# Patient Record
Sex: MALE | Race: BLACK OR AFRICAN AMERICAN | NOT HISPANIC OR LATINO | URBAN - METROPOLITAN AREA
[De-identification: names, ages, dates, MRNs, and addresses within clinical notes are randomized per-mention and may not be internally consistent; named-entity substitution may affect disease eponyms.]

---

## 2021-04-22 PROCEDURE — 93010 ELECTROCARDIOGRAM REPORT: CPT

## 2021-04-23 ENCOUNTER — EMERGENCY (EMERGENCY)
Facility: HOSPITAL | Age: 76
LOS: 0 days | Discharge: HOME | End: 2021-04-23
Attending: EMERGENCY MEDICINE
Payer: MEDICARE

## 2021-04-23 VITALS
OXYGEN SATURATION: 99 % | DIASTOLIC BLOOD PRESSURE: 84 MMHG | TEMPERATURE: 98 F | RESPIRATION RATE: 17 BRPM | HEART RATE: 62 BPM | SYSTOLIC BLOOD PRESSURE: 143 MMHG

## 2021-04-23 VITALS
WEIGHT: 149.03 LBS | SYSTOLIC BLOOD PRESSURE: 136 MMHG | HEIGHT: 69 IN | DIASTOLIC BLOOD PRESSURE: 83 MMHG | OXYGEN SATURATION: 98 % | RESPIRATION RATE: 17 BRPM | TEMPERATURE: 98 F | HEART RATE: 79 BPM

## 2021-04-23 DIAGNOSIS — Z98.89 OTHER SPECIFIED POSTPROCEDURAL STATES: Chronic | ICD-10-CM

## 2021-04-23 DIAGNOSIS — Z00.00 ENCOUNTER FOR GENERAL ADULT MEDICAL EXAMINATION WITHOUT ABNORMAL FINDINGS: ICD-10-CM

## 2021-04-23 DIAGNOSIS — K43.9 VENTRAL HERNIA WITHOUT OBSTRUCTION OR GANGRENE: ICD-10-CM

## 2021-04-23 DIAGNOSIS — F03.90 UNSPECIFIED DEMENTIA WITHOUT BEHAVIORAL DISTURBANCE: ICD-10-CM

## 2021-04-23 DIAGNOSIS — K43.9 VENTRAL HERNIA WITHOUT OBSTRUCTION OR GANGRENE: Chronic | ICD-10-CM

## 2021-04-23 DIAGNOSIS — E78.00 PURE HYPERCHOLESTEROLEMIA, UNSPECIFIED: ICD-10-CM

## 2021-04-23 DIAGNOSIS — I10 ESSENTIAL (PRIMARY) HYPERTENSION: ICD-10-CM

## 2021-04-23 LAB
ALBUMIN SERPL ELPH-MCNC: 3.5 G/DL — SIGNIFICANT CHANGE UP (ref 3.3–5)
ALP SERPL-CCNC: 73 U/L — SIGNIFICANT CHANGE UP (ref 40–120)
ALT FLD-CCNC: 35 U/L — SIGNIFICANT CHANGE UP (ref 12–78)
ANION GAP SERPL CALC-SCNC: 4 MMOL/L — LOW (ref 5–17)
AST SERPL-CCNC: 34 U/L — SIGNIFICANT CHANGE UP (ref 15–37)
BASOPHILS # BLD AUTO: 0.03 K/UL — SIGNIFICANT CHANGE UP (ref 0–0.2)
BASOPHILS NFR BLD AUTO: 0.8 % — SIGNIFICANT CHANGE UP (ref 0–2)
BILIRUB SERPL-MCNC: 0.9 MG/DL — SIGNIFICANT CHANGE UP (ref 0.2–1.2)
BUN SERPL-MCNC: 9 MG/DL — SIGNIFICANT CHANGE UP (ref 7–23)
CALCIUM SERPL-MCNC: 8.9 MG/DL — SIGNIFICANT CHANGE UP (ref 8.5–10.1)
CHLORIDE SERPL-SCNC: 102 MMOL/L — SIGNIFICANT CHANGE UP (ref 96–108)
CO2 SERPL-SCNC: 32 MMOL/L — HIGH (ref 22–31)
CREAT SERPL-MCNC: 0.84 MG/DL — SIGNIFICANT CHANGE UP (ref 0.5–1.3)
EOSINOPHIL # BLD AUTO: 0.05 K/UL — SIGNIFICANT CHANGE UP (ref 0–0.5)
EOSINOPHIL NFR BLD AUTO: 1.4 % — SIGNIFICANT CHANGE UP (ref 0–6)
GLUCOSE SERPL-MCNC: 84 MG/DL — SIGNIFICANT CHANGE UP (ref 70–99)
HCT VFR BLD CALC: 37.9 % — LOW (ref 39–50)
HGB BLD-MCNC: 11.8 G/DL — LOW (ref 13–17)
IMM GRANULOCYTES NFR BLD AUTO: 0.3 % — SIGNIFICANT CHANGE UP (ref 0–1.5)
LYMPHOCYTES # BLD AUTO: 1.81 K/UL — SIGNIFICANT CHANGE UP (ref 1–3.3)
LYMPHOCYTES # BLD AUTO: 49.6 % — HIGH (ref 13–44)
MAGNESIUM SERPL-MCNC: 2.2 MG/DL — SIGNIFICANT CHANGE UP (ref 1.6–2.6)
MCHC RBC-ENTMCNC: 26.8 PG — LOW (ref 27–34)
MCHC RBC-ENTMCNC: 31.1 GM/DL — LOW (ref 32–36)
MCV RBC AUTO: 85.9 FL — SIGNIFICANT CHANGE UP (ref 80–100)
MONOCYTES # BLD AUTO: 0.32 K/UL — SIGNIFICANT CHANGE UP (ref 0–0.9)
MONOCYTES NFR BLD AUTO: 8.8 % — SIGNIFICANT CHANGE UP (ref 2–14)
NEUTROPHILS # BLD AUTO: 1.43 K/UL — LOW (ref 1.8–7.4)
NEUTROPHILS NFR BLD AUTO: 39.1 % — LOW (ref 43–77)
NRBC # BLD: 0 /100 WBCS — SIGNIFICANT CHANGE UP (ref 0–0)
PLATELET # BLD AUTO: 254 K/UL — SIGNIFICANT CHANGE UP (ref 150–400)
POTASSIUM SERPL-MCNC: 4.1 MMOL/L — SIGNIFICANT CHANGE UP (ref 3.5–5.3)
POTASSIUM SERPL-SCNC: 4.1 MMOL/L — SIGNIFICANT CHANGE UP (ref 3.5–5.3)
PROT SERPL-MCNC: 7.4 GM/DL — SIGNIFICANT CHANGE UP (ref 6–8.3)
RBC # BLD: 4.41 M/UL — SIGNIFICANT CHANGE UP (ref 4.2–5.8)
RBC # FLD: 14.8 % — HIGH (ref 10.3–14.5)
SODIUM SERPL-SCNC: 138 MMOL/L — SIGNIFICANT CHANGE UP (ref 135–145)
WBC # BLD: 3.65 K/UL — LOW (ref 3.8–10.5)
WBC # FLD AUTO: 3.65 K/UL — LOW (ref 3.8–10.5)

## 2021-04-23 PROCEDURE — 99284 EMERGENCY DEPT VISIT MOD MDM: CPT

## 2021-04-23 NOTE — ED ADULT TRIAGE NOTE - CHIEF COMPLAINT QUOTE
76 y/o male with PMH of HTN, HLD & stage 4 Dementia. Presents to the ED with c/c of needing evaluation due to 5.9 potassium level that was drawn yesterday at PMD office. pt denies any N/V/D/C chills or fever.

## 2021-04-23 NOTE — ED PROVIDER NOTE - OBJECTIVE STATEMENT
75 years old male brought in by wife sts pt had potassium 5.9 which was drawn yesterday. Pt has a hx of dementia alert and oriented to person follows verbal commends but unable to give detail hx wife provides all informations.

## 2021-04-23 NOTE — ED ADULT NURSE NOTE - OBJECTIVE STATEMENT
pt received with wife, sent by pcp , as per spouse pt potassium is elevated . pt denies chest pain, no sob, pt has hx dementia .

## 2021-04-23 NOTE — ED PROVIDER NOTE - PATIENT PORTAL LINK FT
You can access the FollowMyHealth Patient Portal offered by [facility] by registering at the following website: [email or web address]. By joining [employer]’s FollowMyHealth portal, you will also be able to view your health information using other applications (apps) compatible with our system.

## 2021-04-23 NOTE — ED PROVIDER NOTE - CONSTITUTIONAL, MLM
normal... Well appearing, awake, alert, oriented to person, situation and in no apparent distress. Speaking in clear full sentences no nasal flaring no shoulders retractions no diaphoresis smiling pleasant appears very comfortable sitting up in the stretcher in a bright light room

## 2021-04-23 NOTE — ED ADULT NURSE NOTE - CHIEF COMPLAINT QUOTE
74 y/o male with PMH of HTN, HLD & stage 4 Dementia. Presents to the ED with c/c of needing evaluation due to 5.9 potassium level that was drawn yesterday at PMD office. pt denies any N/V/D/C chills or fever.

## 2023-08-02 ENCOUNTER — EMERGENCY (EMERGENCY)
Facility: HOSPITAL | Age: 78
LOS: 0 days | Discharge: ANOTHER HOSPITAL | End: 2023-08-02
Attending: EMERGENCY MEDICINE
Payer: MEDICARE

## 2023-08-02 ENCOUNTER — INPATIENT (INPATIENT)
Facility: HOSPITAL | Age: 78
LOS: 1 days | Discharge: HOME | End: 2023-08-04
Attending: STUDENT IN AN ORGANIZED HEALTH CARE EDUCATION/TRAINING PROGRAM | Admitting: STUDENT IN AN ORGANIZED HEALTH CARE EDUCATION/TRAINING PROGRAM
Payer: MEDICARE

## 2023-08-02 VITALS
RESPIRATION RATE: 18 BRPM | SYSTOLIC BLOOD PRESSURE: 113 MMHG | HEART RATE: 52 BPM | OXYGEN SATURATION: 95 % | HEIGHT: 69 IN | DIASTOLIC BLOOD PRESSURE: 69 MMHG | WEIGHT: 149.91 LBS

## 2023-08-02 VITALS
RESPIRATION RATE: 18 BRPM | TEMPERATURE: 98 F | HEART RATE: 85 BPM | SYSTOLIC BLOOD PRESSURE: 140 MMHG | OXYGEN SATURATION: 99 % | HEIGHT: 69 IN | DIASTOLIC BLOOD PRESSURE: 65 MMHG

## 2023-08-02 VITALS — TEMPERATURE: 97 F
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
## 2023-08-02 DIAGNOSIS — Z98.89 OTHER SPECIFIED POSTPROCEDURAL STATES: Chronic | ICD-10-CM

## 2023-08-02 DIAGNOSIS — I11.9 HYPERTENSIVE HEART DISEASE WITHOUT HEART FAILURE: ICD-10-CM

## 2023-08-02 DIAGNOSIS — K43.9 VENTRAL HERNIA WITHOUT OBSTRUCTION OR GANGRENE: Chronic | ICD-10-CM

## 2023-08-02 DIAGNOSIS — E78.00 PURE HYPERCHOLESTEROLEMIA, UNSPECIFIED: ICD-10-CM

## 2023-08-02 DIAGNOSIS — I10 ESSENTIAL (PRIMARY) HYPERTENSION: ICD-10-CM

## 2023-08-02 DIAGNOSIS — F03.90 UNSPECIFIED DEMENTIA WITHOUT BEHAVIORAL DISTURBANCE: ICD-10-CM
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
## 2023-08-02 DIAGNOSIS — Z88.8 ALLERGY STATUS TO OTHER DRUGS, MEDICAMENTS AND BIOLOGICAL SUBSTANCES: ICD-10-CM
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
## 2023-08-02 DIAGNOSIS — M19.90 UNSPECIFIED OSTEOARTHRITIS, UNSPECIFIED SITE: ICD-10-CM

## 2023-08-02 DIAGNOSIS — R55 SYNCOPE AND COLLAPSE: ICD-10-CM

## 2023-08-02 DIAGNOSIS — G30.9 ALZHEIMER'S DISEASE, UNSPECIFIED: ICD-10-CM

## 2023-08-02 DIAGNOSIS — Z29.9 ENCOUNTER FOR PROPHYLACTIC MEASURES, UNSPECIFIED: ICD-10-CM

## 2023-08-02 DIAGNOSIS — R00.1 BRADYCARDIA, UNSPECIFIED: ICD-10-CM

## 2023-08-02 DIAGNOSIS — Z87.19 PERSONAL HISTORY OF OTHER DISEASES OF THE DIGESTIVE SYSTEM: ICD-10-CM

## 2023-08-02 LAB
ALBUMIN SERPL ELPH-MCNC: 3.5 G/DL — SIGNIFICANT CHANGE UP (ref 3.3–5)
ALBUMIN SERPL ELPH-MCNC: 4.4 G/DL — SIGNIFICANT CHANGE UP (ref 3.3–5)
ALP SERPL-CCNC: 74 U/L — SIGNIFICANT CHANGE UP (ref 40–120)
ALP SERPL-CCNC: 78 U/L — SIGNIFICANT CHANGE UP (ref 40–120)
ALT FLD-CCNC: 21 U/L — SIGNIFICANT CHANGE UP (ref 4–41)
ALT FLD-CCNC: 26 U/L — SIGNIFICANT CHANGE UP (ref 12–78)
ANION GAP SERPL CALC-SCNC: 5 MMOL/L — SIGNIFICANT CHANGE UP (ref 5–17)
ANION GAP SERPL CALC-SCNC: 9 MMOL/L — SIGNIFICANT CHANGE UP (ref 7–14)
APTT BLD: 26.7 SEC — SIGNIFICANT CHANGE UP (ref 24.5–35.6)
APTT BLD: 29.4 SEC — SIGNIFICANT CHANGE UP (ref 24.5–35.6)
AST SERPL-CCNC: 21 U/L — SIGNIFICANT CHANGE UP (ref 15–37)
AST SERPL-CCNC: 26 U/L — SIGNIFICANT CHANGE UP (ref 4–40)
BASOPHILS # BLD AUTO: 0.01 K/UL — SIGNIFICANT CHANGE UP (ref 0–0.2)
BASOPHILS # BLD AUTO: 0.01 K/UL — SIGNIFICANT CHANGE UP (ref 0–0.2)
BASOPHILS NFR BLD AUTO: 0.2 % — SIGNIFICANT CHANGE UP (ref 0–2)
BASOPHILS NFR BLD AUTO: 0.2 % — SIGNIFICANT CHANGE UP (ref 0–2)
BILIRUB SERPL-MCNC: 1.2 MG/DL — SIGNIFICANT CHANGE UP (ref 0.2–1.2)
BILIRUB SERPL-MCNC: 1.8 MG/DL — HIGH (ref 0.2–1.2)
BUN SERPL-MCNC: 11 MG/DL — SIGNIFICANT CHANGE UP (ref 7–23)
BUN SERPL-MCNC: 12 MG/DL — SIGNIFICANT CHANGE UP (ref 7–23)
CALCIUM SERPL-MCNC: 8.8 MG/DL — SIGNIFICANT CHANGE UP (ref 8.5–10.1)
CALCIUM SERPL-MCNC: 9.7 MG/DL — SIGNIFICANT CHANGE UP (ref 8.4–10.5)
CHLORIDE SERPL-SCNC: 102 MMOL/L — SIGNIFICANT CHANGE UP (ref 98–107)
CHLORIDE SERPL-SCNC: 107 MMOL/L — SIGNIFICANT CHANGE UP (ref 96–108)
CK MB BLD-MCNC: 1.2 % — SIGNIFICANT CHANGE UP (ref 0–2.5)
CK MB CFR SERPL CALC: 5 NG/ML — SIGNIFICANT CHANGE UP
CK SERPL-CCNC: 424 U/L — HIGH (ref 30–200)
CO2 SERPL-SCNC: 28 MMOL/L — SIGNIFICANT CHANGE UP (ref 22–31)
CO2 SERPL-SCNC: 29 MMOL/L — SIGNIFICANT CHANGE UP (ref 22–31)
CREAT SERPL-MCNC: 0.79 MG/DL — SIGNIFICANT CHANGE UP (ref 0.5–1.3)
CREAT SERPL-MCNC: 0.82 MG/DL — SIGNIFICANT CHANGE UP (ref 0.5–1.3)
D DIMER BLD IA.RAPID-MCNC: 408 NG/ML DDU — HIGH
EGFR: 90 ML/MIN/1.73M2 — SIGNIFICANT CHANGE UP
EGFR: 91 ML/MIN/1.73M2 — SIGNIFICANT CHANGE UP
EOSINOPHIL # BLD AUTO: 0.02 K/UL — SIGNIFICANT CHANGE UP (ref 0–0.5)
EOSINOPHIL # BLD AUTO: 0.04 K/UL — SIGNIFICANT CHANGE UP (ref 0–0.5)
EOSINOPHIL NFR BLD AUTO: 0.4 % — SIGNIFICANT CHANGE UP (ref 0–6)
EOSINOPHIL NFR BLD AUTO: 0.7 % — SIGNIFICANT CHANGE UP (ref 0–6)
GLUCOSE SERPL-MCNC: 116 MG/DL — HIGH (ref 70–99)
GLUCOSE SERPL-MCNC: 130 MG/DL — HIGH (ref 70–99)
HCT VFR BLD CALC: 37 % — LOW (ref 39–50)
HCT VFR BLD CALC: 39.3 % — SIGNIFICANT CHANGE UP (ref 39–50)
HGB BLD-MCNC: 12.3 G/DL — LOW (ref 13–17)
HGB BLD-MCNC: 12.8 G/DL — LOW (ref 13–17)
IANC: 3.38 K/UL — SIGNIFICANT CHANGE UP (ref 1.8–7.4)
IMM GRANULOCYTES NFR BLD AUTO: 0 % — SIGNIFICANT CHANGE UP (ref 0–0.9)
IMM GRANULOCYTES NFR BLD AUTO: 0.4 % — SIGNIFICANT CHANGE UP (ref 0–0.9)
INR BLD: 0.97 RATIO — SIGNIFICANT CHANGE UP (ref 0.85–1.18)
INR BLD: 1.02 RATIO — SIGNIFICANT CHANGE UP (ref 0.85–1.18)
LYMPHOCYTES # BLD AUTO: 0.9 K/UL — LOW (ref 1–3.3)
LYMPHOCYTES # BLD AUTO: 1.87 K/UL — SIGNIFICANT CHANGE UP (ref 1–3.3)
LYMPHOCYTES # BLD AUTO: 16.6 % — SIGNIFICANT CHANGE UP (ref 13–44)
LYMPHOCYTES # BLD AUTO: 32.8 % — SIGNIFICANT CHANGE UP (ref 13–44)
MAGNESIUM SERPL-MCNC: 2.1 MG/DL — SIGNIFICANT CHANGE UP (ref 1.6–2.6)
MAGNESIUM SERPL-MCNC: 2.2 MG/DL — SIGNIFICANT CHANGE UP (ref 1.6–2.6)
MCHC RBC-ENTMCNC: 28.8 PG — SIGNIFICANT CHANGE UP (ref 27–34)
MCHC RBC-ENTMCNC: 28.9 PG — SIGNIFICANT CHANGE UP (ref 27–34)
MCHC RBC-ENTMCNC: 32.6 GM/DL — SIGNIFICANT CHANGE UP (ref 32–36)
MCHC RBC-ENTMCNC: 33.2 G/DL — SIGNIFICANT CHANGE UP (ref 32–36)
MCV RBC AUTO: 87.1 FL — SIGNIFICANT CHANGE UP (ref 80–100)
MCV RBC AUTO: 88.3 FL — SIGNIFICANT CHANGE UP (ref 80–100)
MONOCYTES # BLD AUTO: 0.34 K/UL — SIGNIFICANT CHANGE UP (ref 0–0.9)
MONOCYTES # BLD AUTO: 0.4 K/UL — SIGNIFICANT CHANGE UP (ref 0–0.9)
MONOCYTES NFR BLD AUTO: 6.3 % — SIGNIFICANT CHANGE UP (ref 2–14)
MONOCYTES NFR BLD AUTO: 7 % — SIGNIFICANT CHANGE UP (ref 2–14)
NEUTROPHILS # BLD AUTO: 3.38 K/UL — SIGNIFICANT CHANGE UP (ref 1.8–7.4)
NEUTROPHILS # BLD AUTO: 4.13 K/UL — SIGNIFICANT CHANGE UP (ref 1.8–7.4)
NEUTROPHILS NFR BLD AUTO: 59.3 % — SIGNIFICANT CHANGE UP (ref 43–77)
NEUTROPHILS NFR BLD AUTO: 76.1 % — SIGNIFICANT CHANGE UP (ref 43–77)
NRBC # BLD: 0 /100 WBCS — SIGNIFICANT CHANGE UP (ref 0–0)
NRBC # BLD: 0 /100 WBCS — SIGNIFICANT CHANGE UP (ref 0–0)
NRBC # FLD: 0 K/UL — SIGNIFICANT CHANGE UP (ref 0–0)
NT-PROBNP SERPL-SCNC: 40 PG/ML — SIGNIFICANT CHANGE UP (ref 0–450)
PHOSPHATE SERPL-MCNC: 2.8 MG/DL — SIGNIFICANT CHANGE UP (ref 2.5–4.5)
PLATELET # BLD AUTO: 198 K/UL — SIGNIFICANT CHANGE UP (ref 150–400)
PLATELET # BLD AUTO: 218 K/UL — SIGNIFICANT CHANGE UP (ref 150–400)
POTASSIUM SERPL-MCNC: 3.7 MMOL/L — SIGNIFICANT CHANGE UP (ref 3.5–5.3)
POTASSIUM SERPL-MCNC: 4 MMOL/L — SIGNIFICANT CHANGE UP (ref 3.5–5.3)
POTASSIUM SERPL-SCNC: 3.7 MMOL/L — SIGNIFICANT CHANGE UP (ref 3.5–5.3)
POTASSIUM SERPL-SCNC: 4 MMOL/L — SIGNIFICANT CHANGE UP (ref 3.5–5.3)
PROT SERPL-MCNC: 6.9 GM/DL — SIGNIFICANT CHANGE UP (ref 6–8.3)
PROT SERPL-MCNC: 7.1 G/DL — SIGNIFICANT CHANGE UP (ref 6–8.3)
PROTHROM AB SERPL-ACNC: 11.4 SEC — SIGNIFICANT CHANGE UP (ref 9.5–13)
PROTHROM AB SERPL-ACNC: 11.6 SEC — SIGNIFICANT CHANGE UP (ref 9.5–13)
RBC # BLD: 4.25 M/UL — SIGNIFICANT CHANGE UP (ref 4.2–5.8)
RBC # BLD: 4.45 M/UL — SIGNIFICANT CHANGE UP (ref 4.2–5.8)
RBC # FLD: 14.4 % — SIGNIFICANT CHANGE UP (ref 10.3–14.5)
RBC # FLD: 14.5 % — SIGNIFICANT CHANGE UP (ref 10.3–14.5)
SODIUM SERPL-SCNC: 139 MMOL/L — SIGNIFICANT CHANGE UP (ref 135–145)
SODIUM SERPL-SCNC: 141 MMOL/L — SIGNIFICANT CHANGE UP (ref 135–145)
TROPONIN I, HIGH SENSITIVITY RESULT: 5.9 NG/L — SIGNIFICANT CHANGE UP
TROPONIN T, HIGH SENSITIVITY RESULT: 13 NG/L — SIGNIFICANT CHANGE UP
WBC # BLD: 5.42 K/UL — SIGNIFICANT CHANGE UP (ref 3.8–10.5)
WBC # BLD: 5.7 K/UL — SIGNIFICANT CHANGE UP (ref 3.8–10.5)
WBC # FLD AUTO: 5.42 K/UL — SIGNIFICANT CHANGE UP (ref 3.8–10.5)
WBC # FLD AUTO: 5.7 K/UL — SIGNIFICANT CHANGE UP (ref 3.8–10.5)

## 2023-08-02 PROCEDURE — 99285 EMERGENCY DEPT VISIT HI MDM: CPT

## 2023-08-02 PROCEDURE — 99233 SBSQ HOSP IP/OBS HIGH 50: CPT

## 2023-08-02 PROCEDURE — 99223 1ST HOSP IP/OBS HIGH 75: CPT

## 2023-08-02 PROCEDURE — 71275 CT ANGIOGRAPHY CHEST: CPT | Mod: 26,MA

## 2023-08-02 PROCEDURE — 93010 ELECTROCARDIOGRAM REPORT: CPT

## 2023-08-02 PROCEDURE — 70450 CT HEAD/BRAIN W/O DYE: CPT | Mod: 26,MA

## 2023-08-02 PROCEDURE — 71046 X-RAY EXAM CHEST 2 VIEWS: CPT | Mod: 26

## 2023-08-02 RX ORDER — DONEPEZIL HYDROCHLORIDE 10 MG/1
10 TABLET, FILM COATED ORAL AT BEDTIME
Refills: 0 | Status: DISCONTINUED | OUTPATIENT
Start: 2023-08-02 | End: 2023-08-04

## 2023-08-02 RX ORDER — OLANZAPINE 15 MG/1
2.5 TABLET, FILM COATED ORAL ONCE
Refills: 0 | Status: COMPLETED | OUTPATIENT
Start: 2023-08-02 | End: 2023-08-02

## 2023-08-02 RX ORDER — ACETAMINOPHEN 500 MG
650 TABLET ORAL EVERY 6 HOURS
Refills: 0 | Status: DISCONTINUED | OUTPATIENT
Start: 2023-08-02 | End: 2023-08-04

## 2023-08-02 RX ORDER — SODIUM CHLORIDE 9 MG/ML
1000 INJECTION, SOLUTION INTRAVENOUS ONCE
Refills: 0 | Status: COMPLETED | OUTPATIENT
Start: 2023-08-02 | End: 2023-08-02

## 2023-08-02 RX ORDER — HYDROCHLOROTHIAZIDE 25 MG
1 TABLET ORAL
Qty: 0 | Refills: 0 | DISCHARGE

## 2023-08-02 RX ORDER — LANOLIN ALCOHOL/MO/W.PET/CERES
3 CREAM (GRAM) TOPICAL AT BEDTIME
Refills: 0 | Status: DISCONTINUED | OUTPATIENT
Start: 2023-08-02 | End: 2023-08-04

## 2023-08-02 RX ORDER — SIMVASTATIN 20 MG/1
20 TABLET, FILM COATED ORAL AT BEDTIME
Refills: 0 | Status: DISCONTINUED | OUTPATIENT
Start: 2023-08-02 | End: 2023-08-04

## 2023-08-02 RX ORDER — SIMVASTATIN 20 MG/1
1 TABLET, FILM COATED ORAL
Qty: 0 | Refills: 0 | DISCHARGE

## 2023-08-02 RX ORDER — AMLODIPINE BESYLATE 2.5 MG/1
1 TABLET ORAL
Qty: 0 | Refills: 0 | DISCHARGE

## 2023-08-02 RX ORDER — HEPARIN SODIUM 5000 [USP'U]/ML
5000 INJECTION INTRAVENOUS; SUBCUTANEOUS EVERY 12 HOURS
Refills: 0 | Status: DISCONTINUED | OUTPATIENT
Start: 2023-08-02 | End: 2023-08-04

## 2023-08-02 RX ADMIN — SODIUM CHLORIDE 1000 MILLILITER(S): 9 INJECTION, SOLUTION INTRAVENOUS at 10:41

## 2023-08-02 RX ADMIN — SODIUM CHLORIDE 1000 MILLILITER(S): 9 INJECTION, SOLUTION INTRAVENOUS at 11:25

## 2023-08-02 RX ADMIN — DONEPEZIL HYDROCHLORIDE 10 MILLIGRAM(S): 10 TABLET, FILM COATED ORAL at 21:59

## 2023-08-02 RX ADMIN — Medication 3 MILLIGRAM(S): at 21:59

## 2023-08-02 RX ADMIN — SIMVASTATIN 20 MILLIGRAM(S): 20 TABLET, FILM COATED ORAL at 21:59

## 2023-08-02 RX ADMIN — OLANZAPINE 2.5 MILLIGRAM(S): 15 TABLET, FILM COATED ORAL at 20:43

## 2023-08-02 NOTE — H&P ADULT - NSHPPHYSICALEXAM_GEN_ALL_CORE
PHYSICAL EXAM:  VITALS: Vital Signs Last 24 Hrs  T(C): 36.8 (02 Aug 2023 21:35), Max: 36.8 (02 Aug 2023 21:35)  T(F): 98.2 (02 Aug 2023 21:35), Max: 98.2 (02 Aug 2023 21:35)  HR: 79 (02 Aug 2023 21:35) (52 - 85)  BP: 149/83 (02 Aug 2023 21:35) (113/69 - 158/95)  BP(mean): --  RR: 18 (02 Aug 2023 21:35) (16 - 18)  SpO2: 100% (02 Aug 2023 21:35) (95% - 100%)    Parameters below as of 02 Aug 2023 21:35  Patient On (Oxygen Delivery Method): room air      GENERAL: NAD, comfortable at bedside  HEAD:  Atraumatic, Normocephalic  EYES: EOMI, PERRL, conjunctiva and sclera clear  ENT: Moist Mucus Membranes present, no ulcers appreciated  NECK: Supple, No JVD  CHEST/LUNG: Clear to auscultation bilaterally; No wheezes, rales or rhonchi, no accessory muscle use  HEART: Regular rate and rhythm; No murmurs, rubs, or gallops, (+)S1, S2  ABDOMEN: Soft, Nontender, Nondistended; Normal Bowel sounds   EXTREMITIES: No clubbing, cyanosis, or edema, + small mild bruising on the anterior shoulder  PSYCH: normal mood and affect  NEUROLOGY: AAOx1, non-focal  SKIN: No rashes or lesions
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
## 2023-08-02 NOTE — ED PROVIDER NOTE - PHYSICAL EXAMINATION
Gen: Alert, NAD  Head: NC, AT   Eyes: PERRL, EOMI, normal lids/conjunctiva  ENT: diminished hearing, patent oropharynx without erythema/exudate, uvula midline  Neck: supple, no tenderness, Trachea midline  Pulm: Bilateral BS, normal resp effort, no wheeze/stridor/retractions  CV: kerwin no M/R/G, 2+ radial and dp pulses bl, no edema  Abd: soft, NT/ND, +BS, no hepatosplenomegaly  Mskel: extremities x4 with normal ROM and no joint effusions. no ctl spine ttp.   Skin: no rash, no bruising   Neuro: AAOx1, no sensory/motor deficits, CN 2-12 intact

## 2023-08-02 NOTE — H&P ADULT - NSHPLABSRESULTS_GEN_ALL_CORE
12.3   5.42  )-----------( 198      ( 02 Aug 2023 09:30 )             37.0       08-02    141  |  107  |  12  ----------------------------<  130<H>  3.7   |  29  |  0.82    Ca    8.8      02 Aug 2023 09:30  Mg     2.2     08-02    TPro  6.9  /  Alb  3.5  /  TBili  1.8<H>  /  DBili  x   /  AST  21  /  ALT  26  /  AlkPhos  74  08-02            PT/INR - ( 02 Aug 2023 09:30 )   PT: 11.6 sec;   INR: 0.97 ratio         PTT - ( 02 Aug 2023 09:30 )  PTT:26.7 sec        Urinalysis Basic - ( 02 Aug 2023 09:30 )    Color: x / Appearance: x / SG: x / pH: x  Gluc: 130 mg/dL / Ketone: x  / Bili: x / Urobili: x   Blood: x / Protein: x / Nitrite: x   Leuk Esterase: x / RBC: x / WBC x   Sq Epi: x / Non Sq Epi: x / Bacteria: x            CXR: As per my read - no opacities noted, Will wait for prelim/official read    EKG: As per my read - sinus bradycardia 416 ms

## 2023-08-02 NOTE — H&P ADULT - NSHPREVIEWOFSYSTEMS_GEN_ALL_CORE
REVIEW OF SYSTEMS:    CONSTITUTIONAL: No weakness, fevers or chills  EYES/ENT: No visual changes;  No dysphagia; No sore throat; No rhinorrhea; No sinus pain/pressure  NECK: No pain or stiffness  RESPIRATORY: No cough, wheezing, hemoptysis; No shortness of breath  CARDIOVASCULAR: No chest pain or palpitations; No lower extremity edema  GASTROINTESTINAL: No abdominal or epigastric pain. No nausea, vomiting, or hematemesis; No diarrhea or constipation. No melena or hematochezia.  GENITOURINARY: No dysuria, frequency or hematuria  NEUROLOGICAL: No numbness or weakness  MSK: ambulates with walker, + shoulder pain b/l  SKIN: No itching, burning, rashes, or lesions   All other review of systems is negative unless indicated above.

## 2023-08-02 NOTE — CONSULT NOTE ADULT - NS ATTEND AMEND GEN_ALL_CORE FT
76 y/o male with PMHx of Alzheimer's dementia (advanced, oriented to self only), HTN, HLD, arthritis transferred from [facility] for syncope evaluation. Sinus kerwin on tele, blocked apc's at OSH. Will need TTE and possible ILR implant.

## 2023-08-02 NOTE — ED ADULT NURSE NOTE - NSICDXPASTSURGICALHX_GEN_ALL_CORE_FT
PAST SURGICAL HISTORY:  Abdominal wall hernia     H/O colonoscopy     H/O cystoscopy Denies hamaturia, BPH
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
## 2023-08-02 NOTE — ED PROVIDER NOTE - PHYSICAL EXAMINATION
Gen: well appearing, in no acute distress, oriented to person only   Head: normal appearing  HEENT: normal conjunctiva, oral mucosa moist, vision grossly intact   Lung: no respiratory distress, speaking in full sentences, CTA b/l, no wheeze, crackles or rhonchi   CV: bradycardic rate and normal rhythm, no murmurs  Abd: soft, non distended, non tender   MSK: no visible deformities, ambulating without difficulty   Neuro: No focal deficits, AAOx3  Skin: Warm, no rashes   Psych: normal affect
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
## 2023-08-02 NOTE — H&P ADULT - PROBLEM SELECTOR PLAN 1
Assessment:  - patient presented to the ED for new onset syncope  - found to have bradycardia at [facility], transferred to [facility] for EP consult  - Troponin neg x1, no significant ST changes on EKG    Plan:  - EP consult - loop recorder pending to be placed  - tele monitoring  - echo pending  - orthostatics to be done  - hold BP meds for now
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
## 2023-08-02 NOTE — ED PROVIDER NOTE - CLINICAL SUMMARY MEDICAL DECISION MAKING FREE TEXT BOX
76 y/o M with hx of HTN, HLD and progressive dementia presenting from [facility] for admission and EP consult. Patient currently at baseline mental status. Plan to admit patient. EP aware.

## 2023-08-02 NOTE — ED ADULT NURSE NOTE - NSICDXPASTMEDICALHX_GEN_ALL_CORE_FT
PAST MEDICAL HISTORY:  Arthritis notes occasional arthritis pain left arm    Dementia     HTN (hypertension)     Hypercholesteremia     Hypertension
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
## 2023-08-02 NOTE — ED ADULT NURSE REASSESSMENT NOTE - NS ED NURSE REASSESS COMMENT FT1
Patient ambulated to restroom, gait unsteady. Patient re-educated on fall precautions. Patient in no acute distress at this time, family member remains at bedside, will continue to monitor.

## 2023-08-02 NOTE — ED ADULT NURSE NOTE - NSFALLUNIVINTERV_ED_ALL_ED
Bed/Stretcher in lowest position, wheels locked, appropriate side rails in place/Call bell, personal items and telephone in reach/Instruct patient to call for assistance before getting out of bed/chair/stretcher/Non-slip footwear applied when patient is off stretcher/[place] to call system/Physically safe environment - no spills, clutter or unnecessary equipment/Purposeful proactive rounding/Room/bathroom lighting operational, light cord in reach
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
## 2023-08-02 NOTE — ED ADULT NURSE NOTE - NSFALLRISKINTERV_ED_ALL_ED

## 2023-08-02 NOTE — H&P ADULT - ASSESSMENT
78 y/o M with pmhx of alzheimer's dementia, HTN, presented to the ED for new syncope episode. EP consult to place ILR and to follow up outpatient. Admit for ILR placement.
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
## 2023-08-03 ENCOUNTER — TRANSCRIPTION ENCOUNTER (OUTPATIENT)
Age: 78
End: 2023-08-03

## 2023-08-03 LAB
ANION GAP SERPL CALC-SCNC: 7 MMOL/L — SIGNIFICANT CHANGE UP (ref 7–14)
APTT BLD: 29 SEC — SIGNIFICANT CHANGE UP (ref 24.5–35.6)
BUN SERPL-MCNC: 10 MG/DL — SIGNIFICANT CHANGE UP (ref 7–23)
CALCIUM SERPL-MCNC: 9 MG/DL — SIGNIFICANT CHANGE UP (ref 8.4–10.5)
CHLORIDE SERPL-SCNC: 103 MMOL/L — SIGNIFICANT CHANGE UP (ref 98–107)
CO2 SERPL-SCNC: 29 MMOL/L — SIGNIFICANT CHANGE UP (ref 22–31)
CREAT SERPL-MCNC: 0.78 MG/DL — SIGNIFICANT CHANGE UP (ref 0.5–1.3)
EGFR: 91 ML/MIN/1.73M2 — SIGNIFICANT CHANGE UP
GLUCOSE SERPL-MCNC: 95 MG/DL — SIGNIFICANT CHANGE UP (ref 70–99)
HCT VFR BLD CALC: 35.9 % — LOW (ref 39–50)
HGB BLD-MCNC: 11.6 G/DL — LOW (ref 13–17)
INR BLD: 1.07 RATIO — SIGNIFICANT CHANGE UP (ref 0.85–1.18)
MAGNESIUM SERPL-MCNC: 2 MG/DL — SIGNIFICANT CHANGE UP (ref 1.6–2.6)
MCHC RBC-ENTMCNC: 28.4 PG — SIGNIFICANT CHANGE UP (ref 27–34)
MCHC RBC-ENTMCNC: 32.3 GM/DL — SIGNIFICANT CHANGE UP (ref 32–36)
MCV RBC AUTO: 87.8 FL — SIGNIFICANT CHANGE UP (ref 80–100)
NRBC # BLD: 0 /100 WBCS — SIGNIFICANT CHANGE UP (ref 0–0)
NRBC # FLD: 0 K/UL — SIGNIFICANT CHANGE UP (ref 0–0)
PHOSPHATE SERPL-MCNC: 3.1 MG/DL — SIGNIFICANT CHANGE UP (ref 2.5–4.5)
PLATELET # BLD AUTO: 199 K/UL — SIGNIFICANT CHANGE UP (ref 150–400)
POTASSIUM SERPL-MCNC: 4 MMOL/L — SIGNIFICANT CHANGE UP (ref 3.5–5.3)
POTASSIUM SERPL-SCNC: 4 MMOL/L — SIGNIFICANT CHANGE UP (ref 3.5–5.3)
PROTHROM AB SERPL-ACNC: 12.1 SEC — SIGNIFICANT CHANGE UP (ref 9.5–13)
RBC # BLD: 4.09 M/UL — LOW (ref 4.2–5.8)
RBC # FLD: 14.4 % — SIGNIFICANT CHANGE UP (ref 10.3–14.5)
SODIUM SERPL-SCNC: 139 MMOL/L — SIGNIFICANT CHANGE UP (ref 135–145)
TROPONIN T, HIGH SENSITIVITY RESULT: 10 NG/L — SIGNIFICANT CHANGE UP
TSH SERPL-MCNC: 1.47 UIU/ML — SIGNIFICANT CHANGE UP (ref 0.27–4.2)
WBC # BLD: 5.02 K/UL — SIGNIFICANT CHANGE UP (ref 3.8–10.5)
WBC # FLD AUTO: 5.02 K/UL — SIGNIFICANT CHANGE UP (ref 3.8–10.5)

## 2023-08-03 PROCEDURE — 99497 ADVNCD CARE PLAN 30 MIN: CPT | Mod: 25

## 2023-08-03 PROCEDURE — 93306 TTE W/DOPPLER COMPLETE: CPT | Mod: 26

## 2023-08-03 PROCEDURE — 99232 SBSQ HOSP IP/OBS MODERATE 35: CPT

## 2023-08-03 RX ORDER — DONEPEZIL HYDROCHLORIDE 10 MG/1
1 TABLET, FILM COATED ORAL
Refills: 0 | DISCHARGE

## 2023-08-03 RX ORDER — MEMANTINE HYDROCHLORIDE 10 MG/1
1 TABLET ORAL
Refills: 4 | DISCHARGE

## 2023-08-03 RX ORDER — SIMVASTATIN 20 MG/1
1 TABLET, FILM COATED ORAL
Refills: 0 | DISCHARGE

## 2023-08-03 RX ORDER — HALOPERIDOL DECANOATE 100 MG/ML
2 INJECTION INTRAMUSCULAR ONCE
Refills: 0 | Status: COMPLETED | OUTPATIENT
Start: 2023-08-03 | End: 2023-08-03

## 2023-08-03 RX ORDER — MEMANTINE HYDROCHLORIDE 10 MG/1
10 TABLET ORAL DAILY
Refills: 0 | Status: DISCONTINUED | OUTPATIENT
Start: 2023-08-03 | End: 2023-08-03

## 2023-08-03 RX ORDER — MEMANTINE HYDROCHLORIDE 10 MG/1
10 TABLET ORAL EVERY 12 HOURS
Refills: 0 | Status: DISCONTINUED | OUTPATIENT
Start: 2023-08-03 | End: 2023-08-04

## 2023-08-03 RX ORDER — HALOPERIDOL DECANOATE 100 MG/ML
1.5 INJECTION INTRAMUSCULAR ONCE
Refills: 0 | Status: COMPLETED | OUTPATIENT
Start: 2023-08-03 | End: 2023-08-03

## 2023-08-03 RX ORDER — MIRTAZAPINE 45 MG/1
1 TABLET, ORALLY DISINTEGRATING ORAL
Refills: 0 | DISCHARGE

## 2023-08-03 RX ORDER — MIRTAZAPINE 45 MG/1
15 TABLET, ORALLY DISINTEGRATING ORAL AT BEDTIME
Refills: 0 | Status: DISCONTINUED | OUTPATIENT
Start: 2023-08-03 | End: 2023-08-04

## 2023-08-03 RX ADMIN — HALOPERIDOL DECANOATE 2 MILLIGRAM(S): 100 INJECTION INTRAMUSCULAR at 03:44

## 2023-08-03 RX ADMIN — DONEPEZIL HYDROCHLORIDE 10 MILLIGRAM(S): 10 TABLET, FILM COATED ORAL at 21:49

## 2023-08-03 RX ADMIN — SIMVASTATIN 20 MILLIGRAM(S): 20 TABLET, FILM COATED ORAL at 21:49

## 2023-08-03 RX ADMIN — HEPARIN SODIUM 5000 UNIT(S): 5000 INJECTION INTRAVENOUS; SUBCUTANEOUS at 17:45

## 2023-08-03 RX ADMIN — HALOPERIDOL DECANOATE 1.5 MILLIGRAM(S): 100 INJECTION INTRAMUSCULAR at 18:23

## 2023-08-03 RX ADMIN — MEMANTINE HYDROCHLORIDE 10 MILLIGRAM(S): 10 TABLET ORAL at 17:45

## 2023-08-03 RX ADMIN — MIRTAZAPINE 15 MILLIGRAM(S): 45 TABLET, ORALLY DISINTEGRATING ORAL at 21:50

## 2023-08-03 NOTE — PATIENT PROFILE ADULT - FALL HARM RISK - HARM RISK INTERVENTIONS

## 2023-08-03 NOTE — PHARMACOTHERAPY INTERVENTION NOTE - COMMENTS
Medication list in Outpatient Medication Review (OMR) updated based on fill information provided by [facility].   Of Note:   - Most medications listed were last filled in year 2022 (last filled dates listed in OMR for reference).   - Most recent meds are Mirtazapine 15mg QHS and Memantine 10mg BID.

## 2023-08-03 NOTE — PHYSICAL THERAPY INITIAL EVALUATION ADULT - AMBULATION SKILLS, REHAB EVAL
Please call [facility]:   1. [phone] to make an appointment with a therapist and psychiatrist. Please remember it can sometimes take 3-6 months to get in to see someone.     2. For acute crisis evaluation call [phone]. This is available 24 hours a day. Located at [address] [place] [address]    3. If you absolutely cannot get into [facility] within 3 months, please contact either of the following groups to see if you can get in sooner:    [facility]  [address]  [address]   Phone: [phone]    [facility]  [address] [place] [address]  Phone: [phone]     independent

## 2023-08-03 NOTE — DISCHARGE NOTE PROVIDER - ATTENDING DISCHARGE PHYSICAL EXAMINATION:
Patient declined comprehensive physical exam on 8/4/23. In NAD, speaking in full sentences, lying comfortably in bed.
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
## 2023-08-03 NOTE — PROGRESS NOTE ADULT - PROBLEM SELECTOR PLAN 4
- PT eval  - c/w dementia meds  - 1:1 for restlessness and redirect patient  - fall risk protocol per Wife, no issues with behavior at home, can have sundowning but controllable and no safety concern julian.   - c/w dementia meds  - 1:1 for restlessness and redirect patient  - fall risk protocol

## 2023-08-03 NOTE — PROGRESS NOTE ADULT - CONVERSATION DETAILS
Spoke to Mrs. Kacy Gonzalez (spouse) who states she is the designated health care Spoke to Mrs. Kacy Gonzalez (spouse) who states she is the designated health care proxy. discussed plan of care, understands. Discussed goals of care, Mrs Gonzalez states that code status hasn't been discussed before and pt doesn't like to discuss right now. States she'll think about it and talk to her daughter, for now would like full code and will notify if anything changes.

## 2023-08-03 NOTE — PROGRESS NOTE ADULT - ASSESSMENT
76 y/o M with pmhx of alzheimer's dementia, HTN, presented to the ED for new syncope episode. EP consult to place ILR and to follow up outpatient. Admit for ILR placement.

## 2023-08-03 NOTE — PROVIDER CONTACT NOTE (OTHER) - ASSESSMENT
Pt is AO1-2; disoriented to time, place, and situation. Pt is agitated and is refusing orthos to be taken at this time despite education.

## 2023-08-03 NOTE — DISCHARGE NOTE PROVIDER - HOSPITAL COURSE
76 y/o M with pmhx of alzheimer's dementia, HTN, presented to the ED for new syncope episode. EP consult to place ILR and to follow up outpatient. Admit for ILR placement. EKG with sinus kerwin, tele with no pauses, TTE showed ____________. Evaluated by PT recommends no skilled needs.    78 y/o M with pmhx of alzheimer's dementia, HTN, presented to the ED for new syncope episode. EP consult to place ILR and to follow up outpatient. Admit for ILR placement. EKG with sinus kerwin, tele with no pauses, TTE showed normal LV internal dimensions and wall thicknesses, normal LV systolic function without segmental wall motion abnormalities, normal LV diastolic function, moderate TR, estimated PASP 35 mmHg. Evaluated by PT recommends no skilled needs.

## 2023-08-03 NOTE — DISCHARGE NOTE PROVIDER - NSDCMRMEDTOKEN_GEN_ALL_CORE_FT
DONEPEZIL HCL 10 MG TABLET: 1 tab(s) orally once a day (Last filled in May/2022)  MEMANTINE HCL 10 MG TABLET: 1 tab(s) orally 2 times a day (Last filled April/2023 x 1 month)  MIRTAZAPINE 15 MG TABLET: 1 tab(s) orally once (at bedtime) (Last filled Carine/2023 x 1 month)  OMEPRAZOLE DR 40 MG CAPSULE: 1 cap(s) orally once a day (Last filled in Aug/2022)  SIMVASTATIN 20 MG TABLET: 1 tab(s) orally once a day (at bedtime) (Last filled in Dec/2022 x 90 days).   DONEPEZIL HCL 10 MG TABLET: 1 tab(s) orally once a day (Last filled in May/2022)  MEMANTINE HCL 10 MG TABLET: 1 tab(s) orally 2 times a day (Last filled April/2023 x 1 month)  MIRTAZAPINE 15 MG TABLET: 1 tab(s) orally once (at bedtime) (Last filled Carine/2023 x 1 month)  SIMVASTATIN 20 MG TABLET: 1 tab(s) orally once a day (at bedtime) (Last filled in Dec/2022 x 90 days).

## 2023-08-03 NOTE — PHYSICAL THERAPY INITIAL EVALUATION ADULT - ADDITIONAL COMMENTS
Pt. was left seated at edge of bed post PT Evaluation, no apparent distress, all lines intact, HR 83 bpm, bed alarm engaged, family member and PCA present.

## 2023-08-03 NOTE — DISCHARGE NOTE PROVIDER - NSDCCPTREATMENT_GEN_ALL_CORE_FT
PRINCIPAL PROCEDURE  Procedure: Transthoracic echocardiogram  Findings and Treatment: DIMENSIONS:  Dimensions:     Normal Values:  LA:     3.1 cm    2.0 - 4.0 cm  Ao:     3.5 cm    2.0 - 3.8 cm  SEPTUM: 0.9 cm    0.6 - 1.2 cm  PWT:    1.1 cm    0.6 - 1.1 cm  LVIDd:  3.8 cm    3.0 - 5.6 cm  LVIDs:  2.8 cm    1.8 - 4.0 cm  Derived Variables:  LVMI: 63 g/m2  RWT: 0.57  Fractional short: 26 %  Ejection Fraction (Modified Barroso Rule): 64 %  -  OBSERVATIONS:  Mitral Valve: Mitral annular calcification, otherwise  normal mitral valve. No mitral regurgitation seen.  Aortic Root: Normal aortic root.  Aortic Valve: Calcified trileaflet aortic valve with normal  opening. No aortic valve regurgitation seen.  Left Atrium: Normal left atrium.  LA volume index = 23  cc/m2.  Left Ventricle: Normal left ventricular systolic function.  No segmental wall motion abnormalities.   Normal left ventricular internal dimensions and wall  thicknesses. Normal left ventricular diastolic function.  Right Heart: Normal right atrium. Normal right ventricular  size and function. Normal tricuspid valve.  Moderate  tricuspid regurgitation. Normal pulmonic valve. No pulmonic  regurgitation.  Pericardium/PleuraNo pericardial effusion seen.  Hemodynamic: Estimated right atrial pressure is 8 mm Hg.  Estimated right ventricular systolic pressure equals 35 mm  Hg, assuming right atrial pressure equals 8 mm Hg,  consistent with borderline pulmonary hypertension.  ------------------------------------------------------------------------  CONCLUSIONS:  1. Normal left atrium.  LA volume index = 23 cc/m2.  2. Normal left ventricular internal dimensions and wall  thicknesses.  3. Normal left ventricular systolic function. No segmental  wall motion abnormalities.  4. Normal left ventricular diastolic function.  5. Normal right atrium.  6. Normal right ventricular size and function.  7. Normal tricuspid valve.  Moderate tricuspid  regurgitation.  8. Estimated pulmonary artery systolic pressure equals 35  mm Hg, assuming right atrial pressure equals 8  mm Hg,  consistent with borderline pulmonary hypertension.  9. No pericardial effusion seen.

## 2023-08-03 NOTE — PHYSICAL THERAPY INITIAL EVALUATION ADULT - GENERAL OBSERVATIONS, REHAB EVAL
Consult received, chart reviewed. Patient received in bed, no apparent distress, +tele, family member present, PCA present.

## 2023-08-03 NOTE — PROGRESS NOTE ADULT - SUBJECTIVE AND OBJECTIVE BOX
78 y/o M with pmhx of alzheimer's dementia, HTN, presented to the ED for new syncope episode. EP consult to place ILR and to follow up outpatient. Admit for ILR placement.    Subjective: overnight with mild agitation, getting out of bed. on tele wiht HR 39, no pauses, no hypotension, vss. pt denies symptoms of lightheadedness, no dizziness. denies any chest pain or palpitations.   spoke to Wife, who states no behavior issues at home. occasional sundowning at home but controllable symptoms. wants to take home when medically stable.   orthostatic pending  TTE pending    MEDICATIONS  (STANDING):  donepezil 10 milliGRAM(s) Oral at bedtime  heparin   Injectable 5000 Unit(s) SubCutaneous every 12 hours  memantine 10 milliGRAM(s) Oral every 12 hours  mirtazapine 15 milliGRAM(s) Oral at bedtime  simvastatin 20 milliGRAM(s) Oral at bedtime    MEDICATIONS  (PRN):  acetaminophen     Tablet .. 650 milliGRAM(s) Oral every 6 hours PRN Temp greater or equal to 38C (100.4F), Mild Pain (1 - 3)  melatonin 3 milliGRAM(s) Oral at bedtime PRN Insomnia    VITAL SIGNS:  T(C): 36.5 (08-03-23 @ 12:08), Max: 37 (08-03-23 @ 05:01)  T(F): 97.7 (08-03-23 @ 12:08), Max: 98.6 (08-03-23 @ 05:01)  HR: 92 (08-03-23 @ 12:08) (39 - 92)  BP: 122/71 (08-03-23 @ 12:08) (114/86 - 149/83)  BP(mean): --  RR: 18 (08-03-23 @ 12:08) (17 - 18)  SpO2: 98% (08-03-23 @ 12:08) (96% - 100%)  Wt(kg): --    PHYSICAL EXAM:  Constitutional: NAD  Head: NC/AT  Eyes: PERRL, anicteric sclera  Neck: supple; no JVD  Respiratory: CTA B/L; no W/R/R  Cardiac: S1/S2, Bradycardiac, no M/R/G  Gastrointestinal: soft, NT/ND; no rebound or guarding; +BSx4  Extremities: WWP, no clubbing or cyanosis; no peripheral edema  Neurologic: AAO1; CNII-XII grossly intact; no focal deficits  Psychiatric: affect and characteristics of appearance, verbalizations, behaviors are appropriate    LABS:                        11.6   5.02  )-----------( 199      ( 03 Aug 2023 11:18 )             35.9     08-03    139  |  103  |  10  ----------------------------<  95  4.0   |  29  |  0.78    Ca    9.0      03 Aug 2023 11:18  Phos  3.1     08-03  Mg     2.00     08-03    TPro  7.1  /  Alb  4.4  /  TBili  1.2  /  DBili  x   /  AST  26  /  ALT  21  /  AlkPhos  78  08-02    PT/INR - ( 03 Aug 2023 11:18 )   PT: 12.1 sec;   INR: 1.07 ratio         PTT - ( 03 Aug 2023 11:18 )  PTT:29.0 sec  Urinalysis Basic - ( 03 Aug 2023 11:18 )    Color: x / Appearance: x / SG: x / pH: x  Gluc: 95 mg/dL / Ketone: x  / Bili: x / Urobili: x   Blood: x / Protein: x / Nitrite: x   Leuk Esterase: x / RBC: x / WBC x   Sq Epi: x / Non Sq Epi: x / Bacteria: x      CAPILLARY BLOOD GLUCOSE      POCT Blood Glucose.: 105 mg/dL (03 Aug 2023 02:00)      RADIOLOGY & ADDITIONAL TESTS: Reviewed.

## 2023-08-03 NOTE — PROGRESS NOTE ADULT - NS ATTEND AMEND GEN_ALL_CORE FT
78 y/o male with PMHx of Alzheimer's dementia (advanced, oriented to self only), HTN, HLD, arthritis transferred from [facility] for syncope and bradycardia. Patient continues to have episodes of sinus bradycardia to the 40's with APC's with no reports of associated symptoms. Awaiting echocardiogram. No heart block on tele. Will follow.

## 2023-08-03 NOTE — PROGRESS NOTE ADULT - SUBJECTIVE AND OBJECTIVE BOX
Patient appears comfortable. Denies chest pain, shortness of breath, palpitations or headache.   Heart rate to 40 bpm overnight and early this morning. No reported symptoms.     Vital Signs Last 24 Hrs  T(C): 36.5 (03 Aug 2023 12:08), Max: 37 (03 Aug 2023 05:01)  T(F): 97.7 (03 Aug 2023 12:08), Max: 98.6 (03 Aug 2023 05:01)  HR: 92 (03 Aug 2023 12:08) (39 - 92)  BP: 122/71 (03 Aug 2023 12:08) (114/86 - 158/95)  BP(mean): --  RR: 18 (03 Aug 2023 12:08) (16 - 18)  SpO2: 98% (03 Aug 2023 12:08) (96% - 100%)    Parameters below as of 03 Aug 2023 12:08  Patient On (Oxygen Delivery Method): room air      Telemetry: sinus rhythm with rates 40-60's. Occasional  APC's.   MEDICATIONS  (STANDING):  donepezil 10 milliGRAM(s) Oral at bedtime  heparin   Injectable 5000 Unit(s) SubCutaneous every 12 hours  simvastatin 20 milliGRAM(s) Oral at bedtime    MEDICATIONS  (PRN):  acetaminophen     Tablet .. 650 milliGRAM(s) Oral every 6 hours PRN Temp greater or equal to 38C (100.4F), Mild Pain (1 - 3)  melatonin 3 milliGRAM(s) Oral at bedtime PRN Insomnia          Physical exam:   Gen- patient awake, alert in NAD. On 1:1 observation  Resp- clear to auscultation. No wheezing, rales or rhonchi  CV- S1 and S2 RRR. No murmurs, gallops or rubs  ABD- soft nontender + bowel sounds  EXT- no edema no calf tenderness.   Neuro- +dementia. cooperative. Not agitated at this time.                             11.6   5.02  )-----------( 199      ( 03 Aug 2023 11:18 )             35.9     PT/INR - ( 03 Aug 2023 11:18 )   PT: 12.1 sec;   INR: 1.07 ratio         PTT - ( 03 Aug 2023 11:18 )  PTT:29.0 sec  08-03    139  |  103  |  10  ----------------------------<  95  4.0   |  29  |  0.78    Ca    9.0      03 Aug 2023 11:18  Phos  3.1     08-03  Mg     2.00     08-03    TPro  7.1  /  Alb  4.4  /  TBili  1.2  /  DBili  x   /  AST  26  /  ALT  21  /  AlkPhos  78  08-02    CARDIAC MARKERS ( 02 Aug 2023 22:35 )  x     / x     / 424 U/L / x     / 5.0 ng/mL          ECHO: pending           Patient appears comfortable. Denies chest pain, shortness of breath, palpitations or headache.   Heart rate to 40 bpm overnight and early this morning.  No reported symptoms. +agitation.    Vital Signs Last 24 Hrs  T(C): 36.5 (03 Aug 2023 12:08), Max: 37 (03 Aug 2023 05:01)  T(F): 97.7 (03 Aug 2023 12:08), Max: 98.6 (03 Aug 2023 05:01)  HR: 92 (03 Aug 2023 12:08) (39 - 92)  BP: 122/71 (03 Aug 2023 12:08) (114/86 - 158/95)  BP(mean): --  RR: 18 (03 Aug 2023 12:08) (16 - 18)  SpO2: 98% (03 Aug 2023 12:08) (96% - 100%)    Parameters below as of 03 Aug 2023 12:08  Patient On (Oxygen Delivery Method): room air      Telemetry: sinus rhythm with rates 40-60's. Occasional  APC's.   MEDICATIONS  (STANDING):  donepezil 10 milliGRAM(s) Oral at bedtime  heparin   Injectable 5000 Unit(s) SubCutaneous every 12 hours  simvastatin 20 milliGRAM(s) Oral at bedtime    MEDICATIONS  (PRN):  acetaminophen     Tablet .. 650 milliGRAM(s) Oral every 6 hours PRN Temp greater or equal to 38C (100.4F), Mild Pain (1 - 3)  melatonin 3 milliGRAM(s) Oral at bedtime PRN Insomnia          Physical exam:   Gen- patient awake, alert in NAD. On 1:1 observation  Resp- clear to auscultation. No wheezing, rales or rhonchi  CV- S1 and S2 RRR. No murmurs, gallops or rubs  ABD- soft nontender + bowel sounds  EXT- no edema no calf tenderness.   Neuro- +dementia. cooperative. Not agitated at this time.                             11.6   5.02  )-----------( 199      ( 03 Aug 2023 11:18 )             35.9     PT/INR - ( 03 Aug 2023 11:18 )   PT: 12.1 sec;   INR: 1.07 ratio         PTT - ( 03 Aug 2023 11:18 )  PTT:29.0 sec  08-03    139  |  103  |  10  ----------------------------<  95  4.0   |  29  |  0.78    Ca    9.0      03 Aug 2023 11:18  Phos  3.1     08-03  Mg     2.00     08-03    TPro  7.1  /  Alb  4.4  /  TBili  1.2  /  DBili  x   /  AST  26  /  ALT  21  /  AlkPhos  78  08-02    CARDIAC MARKERS ( 02 Aug 2023 22:35 )  x     / x     / 424 U/L / x     / 5.0 ng/mL          ECHO: pending

## 2023-08-03 NOTE — DISCHARGE NOTE PROVIDER - NSDCFUSCHEDAPPT_GEN_ALL_CORE_FT
[facility] Physician [facility] [phone]  Scheduled Appointment: 08/21/2023     [facility] [phone]  Scheduled Appointment: 08/21/2023    [facility] [phone]  Scheduled Appointment: 09/21/2023

## 2023-08-03 NOTE — DISCHARGE NOTE PROVIDER - NSDCCPCAREPLAN_GEN_ALL_CORE_FT
PRINCIPAL DISCHARGE DIAGNOSIS  Diagnosis: Syncope  Assessment and Plan of Treatment:       SECONDARY DISCHARGE DIAGNOSES  Diagnosis: Bradycardia  Assessment and Plan of Treatment:      PRINCIPAL DISCHARGE DIAGNOSIS  Diagnosis: Bradycardia  Assessment and Plan of Treatment: Please follow up with your primary care provider and cardiologist to continue to monitor for changes in heart rate. your received a Loop recorder to monitor your heart rate.      SECONDARY DISCHARGE DIAGNOSES  Diagnosis: Bradycardia  Assessment and Plan of Treatment: Please follow up with your primary care provider and cardiologist to continue to monitor for changes in heart rate. your received a Loop recorder to monitor your heart rate.

## 2023-08-03 NOTE — PROVIDER CONTACT NOTE (OTHER) - BACKGROUND
76 y/o M w/ PMHx of HTN, dementia, Arthiritis. Came in from [facility] for bradycardia and unresponsiveness.

## 2023-08-03 NOTE — PROGRESS NOTE ADULT - PROBLEM SELECTOR PLAN 1
Assessment:  - patient presented to the ED for new onset syncope  - found to have bradycardia at [facility], transferred to [facility] for EP consult  - Troponin neg x1, no significant ST changes on EKG    Plan:  - EP consult - loop recorder pending to be placed  - tele monitoring  - echo pending  - orthostatics to be done  - hold BP meds for now Assessment:  - patient presented to the ED for new onset syncope  - found to have bradycardia at [facility], transferred to [facility] for EP consult  - Troponin neg x1, no significant ST changes on EKG    Plan:  - EP consult - loop recorder pending to be placed  - tele monitoring  - echo pending  - orthostatics pending   - repeat Trop and TSH  - hold BP meds for now
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
## 2023-08-04 ENCOUNTER — TRANSCRIPTION ENCOUNTER (OUTPATIENT)
Age: 78
End: 2023-08-04

## 2023-08-04 VITALS
DIASTOLIC BLOOD PRESSURE: 70 MMHG | OXYGEN SATURATION: 97 % | TEMPERATURE: 98 F | RESPIRATION RATE: 18 BRPM | SYSTOLIC BLOOD PRESSURE: 142 MMHG | HEART RATE: 75 BPM

## 2023-08-04 LAB
ANION GAP SERPL CALC-SCNC: 9 MMOL/L — SIGNIFICANT CHANGE UP (ref 7–14)
BUN SERPL-MCNC: 8 MG/DL — SIGNIFICANT CHANGE UP (ref 7–23)
CALCIUM SERPL-MCNC: 9.1 MG/DL — SIGNIFICANT CHANGE UP (ref 8.4–10.5)
CHLORIDE SERPL-SCNC: 102 MMOL/L — SIGNIFICANT CHANGE UP (ref 98–107)
CK SERPL-CCNC: 224 U/L — HIGH (ref 30–200)
CO2 SERPL-SCNC: 27 MMOL/L — SIGNIFICANT CHANGE UP (ref 22–31)
CREAT SERPL-MCNC: 0.71 MG/DL — SIGNIFICANT CHANGE UP (ref 0.5–1.3)
EGFR: 94 ML/MIN/1.73M2 — SIGNIFICANT CHANGE UP
GLUCOSE SERPL-MCNC: 87 MG/DL — SIGNIFICANT CHANGE UP (ref 70–99)
HCT VFR BLD CALC: 36.4 % — LOW (ref 39–50)
HGB BLD-MCNC: 11.5 G/DL — LOW (ref 13–17)
MAGNESIUM SERPL-MCNC: 2 MG/DL — SIGNIFICANT CHANGE UP (ref 1.6–2.6)
MCHC RBC-ENTMCNC: 27.8 PG — SIGNIFICANT CHANGE UP (ref 27–34)
MCHC RBC-ENTMCNC: 31.6 GM/DL — LOW (ref 32–36)
MCV RBC AUTO: 87.9 FL — SIGNIFICANT CHANGE UP (ref 80–100)
NRBC # BLD: 0 /100 WBCS — SIGNIFICANT CHANGE UP (ref 0–0)
NRBC # FLD: 0 K/UL — SIGNIFICANT CHANGE UP (ref 0–0)
PHOSPHATE SERPL-MCNC: 3.3 MG/DL — SIGNIFICANT CHANGE UP (ref 2.5–4.5)
PLATELET # BLD AUTO: 206 K/UL — SIGNIFICANT CHANGE UP (ref 150–400)
POTASSIUM SERPL-MCNC: 3.8 MMOL/L — SIGNIFICANT CHANGE UP (ref 3.5–5.3)
POTASSIUM SERPL-SCNC: 3.8 MMOL/L — SIGNIFICANT CHANGE UP (ref 3.5–5.3)
RBC # BLD: 4.14 M/UL — LOW (ref 4.2–5.8)
RBC # FLD: 14.6 % — HIGH (ref 10.3–14.5)
SODIUM SERPL-SCNC: 138 MMOL/L — SIGNIFICANT CHANGE UP (ref 135–145)
WBC # BLD: 5.26 K/UL — SIGNIFICANT CHANGE UP (ref 3.8–10.5)
WBC # FLD AUTO: 5.26 K/UL — SIGNIFICANT CHANGE UP (ref 3.8–10.5)

## 2023-08-04 PROCEDURE — 33285 INSJ SUBQ CAR RHYTHM MNTR: CPT

## 2023-08-04 PROCEDURE — 99232 SBSQ HOSP IP/OBS MODERATE 35: CPT

## 2023-08-04 RX ORDER — HALOPERIDOL DECANOATE 100 MG/ML
2.5 INJECTION INTRAMUSCULAR ONCE
Refills: 0 | Status: DISCONTINUED | OUTPATIENT
Start: 2023-08-04 | End: 2023-08-04

## 2023-08-04 RX ORDER — OMEPRAZOLE 10 MG/1
1 CAPSULE, DELAYED RELEASE ORAL
Refills: 0 | DISCHARGE

## 2023-08-04 RX ADMIN — MEMANTINE HYDROCHLORIDE 10 MILLIGRAM(S): 10 TABLET ORAL at 05:47

## 2023-08-04 RX ADMIN — HEPARIN SODIUM 5000 UNIT(S): 5000 INJECTION INTRAVENOUS; SUBCUTANEOUS at 05:48

## 2023-08-04 RX ADMIN — MEMANTINE HYDROCHLORIDE 10 MILLIGRAM(S): 10 TABLET ORAL at 17:14

## 2023-08-04 RX ADMIN — HEPARIN SODIUM 5000 UNIT(S): 5000 INJECTION INTRAVENOUS; SUBCUTANEOUS at 17:14

## 2023-08-04 NOTE — PROGRESS NOTE ADULT - PROBLEM SELECTOR PLAN 4
per Wife, no issues with behavior at home, can have sundowning but controllable and no safety concern julian.   - c/w dementia meds  - 1:1 for restlessness and redirect patient  - fall risk protocol

## 2023-08-04 NOTE — CHART NOTE - NSCHARTNOTEFT_GEN_A_CORE
ELECTROPHYSIOLOGY      Patient s/p placement of an ILR. Tolerated the procedure well. No complications.   Vital signs stable. Telemetry: NSR  Dressing dry and intact. No evidence of bleeding, ecchymosis or swelling.   Teaching done with daughter/HCP Tanya Hester via the telephone.   Written instructions and contact information provided.   Patient has a bedside home monitor with written instructions.   Dressing to be removed by daughter in the morning.   Patient has a follow-up appointment in the device clinic on 8/21/23 at 1:20pm  [address] [place] [address] [facility] [phone].
Notified by RN that patient is restless, agitated and attempting to walk out of the room even with repeated redirection . Upon reaching the unit, writer found the patient is standing in the hallway ,  outside his room and refusing to go back. Patient removed his telemetry monitor and refusing  at that time. Ordered haldol 2 mg ivx 1 dose.   Patient is AxOX1, States his name but unable to tell the location. Writer  explain to the patient the need for telemetry as he came in with bradycardia. Patient was encouraged to go back to his room. After multiple redirection attempt, patient went back to the room . Patient was attempting to pull the IJ iv access. As patient has working iv access peripherally, writer removed the IJ iv access. Patient was placed back on telemetry. Patient is on 1:1 for safety and elopement risk. Will continue to monitor.
Pt ok for Dc by EP and medicine.  Pt began to sundown, know by staff and family ( daughter also MD)  Attg approved 2.5 mg haldol PO to calm pt for discharge.   Family really requesting DC, sts pt will deal with sundowning better in home environment.

## 2023-08-04 NOTE — PROGRESS NOTE ADULT - PROBLEM SELECTOR PLAN 1
Assessment:  - patient presented to the ED for new onset syncope  - found to have bradycardia at [facility], transferred to [facility] for EP consult  - Troponin neg x1, no significant ST changes on EKG    Plan:  - EP consult - loop recorder placement today  - tele monitoring  - echo reviewed  - hold BP meds for now

## 2023-08-04 NOTE — PROGRESS NOTE ADULT - SUBJECTIVE AND OBJECTIVE BOX
[facility] Division of Hospital Medicine  Fredis Riley DO  Available via MS Teams  In house pager [phone]    SUBJECTIVE / OVERNIGHT EVENTS:  No acute events overnight. Denies acute complaints. States he is fine. Declines physical examination.     ADDITIONAL REVIEW OF SYSTEMS:    MEDICATIONS  (STANDING):  donepezil 10 milliGRAM(s) Oral at bedtime  heparin   Injectable 5000 Unit(s) SubCutaneous every 12 hours  memantine 10 milliGRAM(s) Oral every 12 hours  mirtazapine 15 milliGRAM(s) Oral at bedtime  simvastatin 20 milliGRAM(s) Oral at bedtime    MEDICATIONS  (PRN):  acetaminophen     Tablet .. 650 milliGRAM(s) Oral every 6 hours PRN Temp greater or equal to 38C (100.4F), Mild Pain (1 - 3)  melatonin 3 milliGRAM(s) Oral at bedtime PRN Insomnia      I&O's Summary      PHYSICAL EXAM:  Vital Signs Last 24 Hrs  T(C): 36.4 (04 Aug 2023 04:58), Max: 36.9 (03 Aug 2023 21:41)  T(F): 97.6 (04 Aug 2023 04:58), Max: 98.5 (03 Aug 2023 21:41)  HR: 53 (04 Aug 2023 04:58) (35 - 65)  BP: 125/77 (04 Aug 2023 04:58) (125/77 - 143/84)  BP(mean): --  RR: 15 (04 Aug 2023 04:58) (15 - 17)  SpO2: 99% (04 Aug 2023 04:58) (97% - 99%)    Parameters below as of 04 Aug 2023 04:58  Patient On (Oxygen Delivery Method): room air      Patient declined physical examination, stating he is fine. Lying comfortably in bed, speaking full sentences, in no acute distress.   No obvious accessory muscle use.     LABS:                        11.5   5.26  )-----------( 206      ( 04 Aug 2023 07:16 )             36.4     08-04    138  |  102  |  8   ----------------------------<  87  3.8   |  27  |  0.71    Ca    9.1      04 Aug 2023 07:16  Phos  3.3     08-04  Mg     2.00     08-04    TPro  7.1  /  Alb  4.4  /  TBili  1.2  /  DBili  x   /  AST  26  /  ALT  21  /  AlkPhos  78  08-02    PT/INR - ( 03 Aug 2023 11:18 )   PT: 12.1 sec;   INR: 1.07 ratio         PTT - ( 03 Aug 2023 11:18 )  PTT:29.0 sec  CARDIAC MARKERS ( 04 Aug 2023 07:16 )  x     / x     / 224 U/L / x     / x      CARDIAC MARKERS ( 02 Aug 2023 22:35 )  x     / x     / 424 U/L / x     / 5.0 ng/mL      Urinalysis Basic - ( 04 Aug 2023 07:16 )    Color: x / Appearance: x / SG: x / pH: x  Gluc: 87 mg/dL / Ketone: x  / Bili: x / Urobili: x   Blood: x / Protein: x / Nitrite: x   Leuk Esterase: x / RBC: x / WBC x   Sq Epi: x / Non Sq Epi: x / Bacteria: x

## 2023-08-04 NOTE — PROGRESS NOTE ADULT - PROBLEM SELECTOR PLAN 5
- heparin subq for dvt ppx
- heparin subq for dvt ppx    Dispo: ILR today. Discuss with EP whether pt for dc today after procedure vs tomorrow.

## 2023-08-04 NOTE — PROGRESS NOTE ADULT - NS ATTEND AMEND GEN_ALL_CORE FT
78 y/o male with PMHx of Alzheimer's dementia (advanced, oriented to self only), HTN, HLD, arthritis transferred from [facility] for syncope evaluation. Sinus kerwin on tele, blocked apc's at OSH. Will need TTE and plan for ILR implant today.

## 2023-08-04 NOTE — PROGRESS NOTE ADULT - ASSESSMENT
78 y/o male with PMHx of Alzheimer's dementia (advanced, oriented to self only), HTN, HLD, arthritis transferred from [facility] for syncope evaluation. Orthostatics are negative. EKG and telemetry demonstrate heart rate trends to low 40's during sleeping hours and 60-80's with blocked APC's during daytime hours. Evidence of chronotropic competence.  Currently he offers no complaints including chest pain, shortness of breath, palpitations or dizziness.   EKG:  Sinu bradycardia 54 bpm QRSd 84ms.  QTc 420ms.  +LVH.   Review of rhythm strip from [facility] shows sinus bradycardia with blocked APC's. No evidence of heart block. His daughter, who is an anesthesiologist, states that his heart rate remained in the 40's for some time prior to transferring here and is unaware of any prior history of bradycardia.    Echocardiogram: Normal LV systolic and diastolic function> EF 64%. No significant valvular disease.   TSH: 1.47  Orthostatics negative  Keep K+ > 4.0 and Mg > 2.0.   Would consider neuro evaluation.   At this time there is no indication for a pacemaker but would continue to monitor on telemetry. We have discussed with his daughter and wife placement of a loop recorder prior to discharge which would allow for long term  monitoring for detection of an arrhythmia as the cause of syncope. They understand the plan and agree.  Plan: ILR implantation today.

## 2023-08-04 NOTE — PROGRESS NOTE ADULT - TIME BILLING
Time-based billing (NON-critical care).     35 minutes spent on total encounter; more than 50% of the visit was spent counseling and / or coordinating care by the attending physician.  The necessity of the time spent during the encounter on this date of service was due to:     review of laboratory data, radiology results, consultants' recommendations, documentation in [facility], discussion with patient/ACP and interdisciplinary staff (such as , social workers, etc). Interventions were performed as documented above.
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
## 2023-08-04 NOTE — DIETITIAN INITIAL EVALUATION ADULT - OTHER INFO
Pt 76 yo male with PMHx of alzheimer's dementia, HTN, presented with syncope episode - per chart review. Of note, EP consulted, plan for ILR implantation today.    At time of visit, Pt awake, alert. Per Pt, his appetite good. Of note, Pt's diet rx includes Minced & moist consistency at present. Pt denies having any chewing or swallowing difficulty with current diet consistency. No report of vomiting or diarrhea @ this time.     Per Pt, his height: ~68"; Pt could not confirm his actual body weight or any weight loss or weight changes PTA. Of note, Pt's weights: 71 kg (8/3/23), 67.6 kg (HIE - 4/23/21) --> 3.4 kg of weight gain. Food preferences discussed. No other food related concern voiced at present. Case discussed with nurse. RD remains available.

## 2023-08-04 NOTE — PROGRESS NOTE ADULT - SUBJECTIVE AND OBJECTIVE BOX
Patient awake, alert with no complaints.  Denies chest pain, dizziness, headache shortness of breath, palpitations.       Vital Signs Last 24 Hrs  T(C): 36.4 (04 Aug 2023 04:58), Max: 36.9 (03 Aug 2023 21:41)  T(F): 97.6 (04 Aug 2023 04:58), Max: 98.5 (03 Aug 2023 21:41)  HR: 53 (04 Aug 2023 04:58) (35 - 92)  BP: 125/77 (04 Aug 2023 04:58) (122/71 - 143/84)  BP(mean): --  RR: 15 (04 Aug 2023 04:58) (15 - 18)  SpO2: 99% (04 Aug 2023 04:58) (97% - 99%)    Parameters below as of 04 Aug 2023 04:58  Patient On (Oxygen Delivery Method): room air        Telemetry: Normal sinus rhythm with APC's Episodes of sinus kerwin 40 bpm during sleep. This morning, heart rate 70-80's.   MEDICATIONS  (STANDING):  donepezil 10 milliGRAM(s) Oral at bedtime  heparin   Injectable 5000 Unit(s) SubCutaneous every 12 hours  memantine 10 milliGRAM(s) Oral every 12 hours  mirtazapine 15 milliGRAM(s) Oral at bedtime  simvastatin 20 milliGRAM(s) Oral at bedtime    MEDICATIONS  (PRN):  acetaminophen     Tablet .. 650 milliGRAM(s) Oral every 6 hours PRN Temp greater or equal to 38C (100.4F), Mild Pain (1 - 3)  melatonin 3 milliGRAM(s) Oral at bedtime PRN Insomnia          Physical exam:   Gen-  Resp-   CV-   ABD-  EXT-  Neuro                            11.5   5.26  )-----------( 206      ( 04 Aug 2023 07:16 )             36.4     PT/INR - ( 03 Aug 2023 11:18 )   PT: 12.1 sec;   INR: 1.07 ratio         PTT - ( 03 Aug 2023 11:18 )  PTT:29.0 sec  08-04    138  |  102  |  8   ----------------------------<  87  3.8   |  27  |  0.71    Ca    9.1      04 Aug 2023 07:16  Phos  3.3     08-04  Mg     2.00     08-04    TPro  7.1  /  Alb  4.4  /  TBili  1.2  /  DBili  x   /  AST  26  /  ALT  21  /  AlkPhos  78  08-02    CARDIAC MARKERS ( 04 Aug 2023 07:16 )  x     / x     / 224 U/L / x     / x      CARDIAC MARKERS ( 02 Aug 2023 22:35 )  x     / x     / 424 U/L / x     / 5.0 ng/mL              Assessment and Plan:         Patient awake, alert with no complaints.  Denies chest pain, dizziness, headache shortness of breath, palpitations.       Vital Signs Last 24 Hrs  T(C): 36.4 (04 Aug 2023 04:58), Max: 36.9 (03 Aug 2023 21:41)  T(F): 97.6 (04 Aug 2023 04:58), Max: 98.5 (03 Aug 2023 21:41)  HR: 53 (04 Aug 2023 04:58) (35 - 92)  BP: 125/77 (04 Aug 2023 04:58) (122/71 - 143/84)  BP(mean): --  RR: 15 (04 Aug 2023 04:58) (15 - 18)  SpO2: 99% (04 Aug 2023 04:58) (97% - 99%)  Orthostatics:   Lyin/84  66  Sittin/96  74  Standin/90  87    Parameters below as of 04 Aug 2023 04:58  Patient On (Oxygen Delivery Method): room air        Telemetry: Normal sinus rhythm with APC's Episodes of sinus kerwin 40 bpm during sleep. This morning, heart rate 70-80's.   MEDICATIONS  (STANDING):  donepezil 10 milliGRAM(s) Oral at bedtime  heparin   Injectable 5000 Unit(s) SubCutaneous every 12 hours  memantine 10 milliGRAM(s) Oral every 12 hours  mirtazapine 15 milliGRAM(s) Oral at bedtime  simvastatin 20 milliGRAM(s) Oral at bedtime    MEDICATIONS  (PRN):  acetaminophen     Tablet .. 650 milliGRAM(s) Oral every 6 hours PRN Temp greater or equal to 38C (100.4F), Mild Pain (1 - 3)  melatonin 3 milliGRAM(s) Oral at bedtime PRN Insomnia          Physical exam:   Gen- well developed well nourished NAD. Cooperates with exam  Resp- clear to auscultation. No wheezing, rales or rhonchi  CV- S1 and S2 RRR. No murmurs, gallops or rubs  ABD- soft nontender = bowel sounds  EXT- no edema. No calf tenderness. Strong and equal pedal pulses  Neuro- confused. Not agitated. Follows simple commands.                             11.5   5.26  )-----------( 206      ( 04 Aug 2023 07:16 )             36.4     PT/INR - ( 03 Aug 2023 11:18 )   PT: 12.1 sec;   INR: 1.07 ratio         PTT - ( 03 Aug 2023 11:18 )  PTT:29.0 sec  08-04    138  |  102  |  8   ----------------------------<  87  3.8   |  27  |  0.71    Ca    9.1      04 Aug 2023 07:16  Phos  3.3     08-04  Mg     2.00     08-04    TPro  7.1  /  Alb  4.4  /  TBili  1.2  /  DBili  x   /  AST  26  /  ALT  21  /  AlkPhos  78  08-02    CARDIAC MARKERS ( 04 Aug 2023 07:16 )  x     / x     / 224 U/L / x     / x      CARDIAC MARKERS ( 02 Aug 2023 22:35 )  x     / x     / 424 U/L / x     / 5.0 ng/mL      < from: Transthoracic Echocardiogram (23 @ 14:40) >  Patient name: MACKENZIE VILLALOBOS  YOB: 1945   Age: 77 (M)   MR#: [id number]  Study Date: 8/3/2023  [facility]  Study quality: Technically good  Referring Physician: Bernice Tilley MD  Blood Pressure: 130/79 mmHg  Height: 175 cm  Weight: 71 kg  BSA: 1.9 m2  Heart Rate: 75 mmHg  ------------------------------------------------------------------------  PROCEDURE: Transthoracic echocardiogram with 2-D, M-Mode  and complete spectral and color flow Doppler.  INDICATION: Syncope and collapse (R55)  ------------------------------------------------------------------------  DIMENSIONS:  Dimensions:     Normal Values:  LA:     3.1 cm    2.0 - 4.0 cm  Ao:     3.5 cm    2.0 - 3.8 cm  SEPTUM: 0.9 cm    0.6 - 1.2cm  PWT:    1.1 cm    0.6 - 1.1 cm  LVIDd:  3.8 cm    3.0 - 5.6 cm  LVIDs:  2.8 cm    1.8 - 4.0 cm  Derived Variables:  LVMI: 63 g/m2  RWT: 0.57  Fractional short: 26 %  Ejection Fraction (Modified Barroso Rule): 64 %  ------------------------------------------------------------------------  OBSERVATIONS:  Mitral Valve: Mitral annular calcification, otherwise  normal mitral valve. No mitral regurgitation seen.  Aortic Root: Normal aortic root.  Aortic Valve: Calcified trileaflet aortic valve with normal  opening. No aortic valve regurgitation seen.  Left Atrium: Normal left atrium.  LA volume index = 23  cc/m2.  Left Ventricle: Normal left ventricular systolic function.  No segmental wall motion abnormalities.   Normal left ventricular internal dimensions and wall  thicknesses. Normal left ventricular diastolic function.  Right Heart: Normal right atrium. Normal right ventricular  size and function. Normal tricuspid valve.  Moderate  tricuspid regurgitation. Normal pulmonic valve. No pulmonic  regurgitation.  Pericardium/PleuraNo pericardial effusion seen.  Hemodynamic: Estimated right atrial pressure is 8 mm Hg.  Estimated right ventricular systolic pressure equals 35 mm  Hg, assuming right atrial pressure equals 8 mm Hg,  consistentwith borderline pulmonary hypertension.  ------------------------------------------------------------------------  CONCLUSIONS:  1. Normal left atrium.  LA volume index = 23 cc/m2.  2. Normal left ventricular internal dimensions and wall  thicknesses.  3. Normal left ventricular systolic function. No segmental  wall motion abnormalities.  4. Normal left ventricular diastolic function.  5. Normal right atrium.  6. Normal right ventricular size and function.  7. Normal tricuspid valve.  Moderate tricuspid  regurgitation.  8. Estimated pulmonary artery systolic pressure equals 35  mm Hg, assuming right atrial pressure equals 8  mm Hg,  consistent with borderline pulmonary hypertension.  9. No pericardial effusion seen.  *** No previous Echo exam.  ------------------------------------------------------------------------  Confirmed on  8/3/2023 - 18:21:37 by Massiel Simon M.D.  ------------------------------------------------------------------------    < end of copied text >    < end of copied text >

## 2023-08-04 NOTE — DIETITIAN INITIAL EVALUATION ADULT - PERTINENT LABORATORY DATA
08-04    138  |  102  |  8   ----------------------------<  87  3.8   |  27  |  0.71    Ca    9.1      04 Aug 2023 07:16  Phos  3.3     08-04  Mg     2.00     08-04    TPro  7.1  /  Alb  4.4  /  TBili  1.2  /  DBili  x   /  AST  26  /  ALT  21  /  AlkPhos  78  08-02

## 2023-08-04 NOTE — DISCHARGE NOTE NURSING/CASE MANAGEMENT/SOCIAL WORK - NSDCPEFALRISK_GEN_ALL_CORE
For information on Fall & Injury Prevention, visit: [email or web address] OR  [email or web address] OR  https://www.cdc.gov/steadi/patient.html

## 2023-08-07 PROBLEM — Z00.00 ENCOUNTER FOR PREVENTIVE HEALTH EXAMINATION: Status: ACTIVE | Noted: 2023-08-07

## 2023-08-21 ENCOUNTER — APPOINTMENT (OUTPATIENT)
Dept: ELECTROPHYSIOLOGY | Facility: CLINIC | Age: 78
End: 2023-08-21
Payer: MEDICARE

## 2023-08-21 DIAGNOSIS — I10 ESSENTIAL (PRIMARY) HYPERTENSION: ICD-10-CM

## 2023-08-21 DIAGNOSIS — F03.918 UNSPECIFIED DEMENTIA, UNSPECIFIED SEVERITY, WITH OTHER BEHAVIORAL DISTURBANCE: ICD-10-CM

## 2023-08-21 DIAGNOSIS — E78.5 HYPERLIPIDEMIA, UNSPECIFIED: ICD-10-CM

## 2023-08-21 DIAGNOSIS — R55 SYNCOPE AND COLLAPSE: ICD-10-CM

## 2023-08-21 PROCEDURE — 93291 INTERROG DEV EVAL SCRMS IP: CPT

## 2023-08-21 RX ORDER — AMLODIPINE BESYLATE 10 MG/1
10 TABLET ORAL
Refills: 0 | Status: ACTIVE | COMMUNITY

## 2023-08-21 RX ORDER — DONEPEZIL HYDROCHLORIDE 10 MG/1
10 TABLET ORAL
Refills: 0 | Status: ACTIVE | COMMUNITY

## 2023-08-21 RX ORDER — SIMVASTATIN 20 MG/1
20 TABLET, FILM COATED ORAL
Refills: 0 | Status: ACTIVE | COMMUNITY

## 2023-08-21 RX ORDER — MEMANTINE HYDROCHLORIDE 10 MG/1
10 TABLET, FILM COATED ORAL TWICE DAILY
Refills: 0 | Status: ACTIVE | COMMUNITY

## 2023-09-21 ENCOUNTER — NON-APPOINTMENT (OUTPATIENT)
Age: 78
End: 2023-09-21

## 2023-09-21 ENCOUNTER — APPOINTMENT (OUTPATIENT)
Dept: ELECTROPHYSIOLOGY | Facility: CLINIC | Age: 78
End: 2023-09-21
Payer: MEDICARE

## 2023-09-22 PROCEDURE — G2066: CPT

## 2023-09-22 PROCEDURE — 93298 REM INTERROG DEV EVAL SCRMS: CPT

## 2023-10-26 ENCOUNTER — NON-APPOINTMENT (OUTPATIENT)
Age: 78
End: 2023-10-26

## 2023-10-26 ENCOUNTER — APPOINTMENT (OUTPATIENT)
Dept: ELECTROPHYSIOLOGY | Facility: CLINIC | Age: 78
End: 2023-10-26
Payer: MEDICARE

## 2023-10-26 PROCEDURE — G2066: CPT | Mod: NC

## 2023-10-26 PROCEDURE — 93298 REM INTERROG DEV EVAL SCRMS: CPT

## 2023-11-30 ENCOUNTER — APPOINTMENT (OUTPATIENT)
Dept: ELECTROPHYSIOLOGY | Facility: CLINIC | Age: 78
End: 2023-11-30
Payer: MEDICARE

## 2023-11-30 ENCOUNTER — NON-APPOINTMENT (OUTPATIENT)
Age: 78
End: 2023-11-30

## 2023-11-30 PROCEDURE — 93298 REM INTERROG DEV EVAL SCRMS: CPT

## 2023-11-30 PROCEDURE — G2066: CPT | Mod: NC

## 2024-01-05 ENCOUNTER — APPOINTMENT (OUTPATIENT)
Dept: ELECTROPHYSIOLOGY | Facility: CLINIC | Age: 79
End: 2024-01-05
Payer: MEDICARE

## 2024-01-05 ENCOUNTER — NON-APPOINTMENT (OUTPATIENT)
Age: 79
End: 2024-01-05

## 2024-01-05 PROCEDURE — 93298 REM INTERROG DEV EVAL SCRMS: CPT

## 2024-02-07 ENCOUNTER — APPOINTMENT (OUTPATIENT)
Dept: ELECTROPHYSIOLOGY | Facility: CLINIC | Age: 79
End: 2024-02-07

## 2024-02-27 ENCOUNTER — NON-APPOINTMENT (OUTPATIENT)
Age: 79
End: 2024-02-27

## 2024-02-27 ENCOUNTER — APPOINTMENT (OUTPATIENT)
Dept: ELECTROPHYSIOLOGY | Facility: CLINIC | Age: 79
End: 2024-02-27
Payer: MEDICARE

## 2024-02-27 PROCEDURE — 93298 REM INTERROG DEV EVAL SCRMS: CPT

## 2024-04-02 ENCOUNTER — NON-APPOINTMENT (OUTPATIENT)
Age: 79
End: 2024-04-02

## 2024-04-02 ENCOUNTER — APPOINTMENT (OUTPATIENT)
Dept: ELECTROPHYSIOLOGY | Facility: CLINIC | Age: 79
End: 2024-04-02
Payer: MEDICARE

## 2024-04-02 PROCEDURE — 93298 REM INTERROG DEV EVAL SCRMS: CPT

## 2024-05-06 ENCOUNTER — APPOINTMENT (OUTPATIENT)
Dept: ELECTROPHYSIOLOGY | Facility: CLINIC | Age: 79
End: 2024-05-06
Payer: MEDICARE

## 2024-05-06 ENCOUNTER — NON-APPOINTMENT (OUTPATIENT)
Age: 79
End: 2024-05-06

## 2024-05-06 PROCEDURE — 93298 REM INTERROG DEV EVAL SCRMS: CPT

## 2024-05-11 ENCOUNTER — EMERGENCY (EMERGENCY)
Facility: HOSPITAL | Age: 79
LOS: 0 days | Discharge: HOME | End: 2024-05-12
Attending: STUDENT IN AN ORGANIZED HEALTH CARE EDUCATION/TRAINING PROGRAM
Payer: MEDICARE

## 2024-05-11 VITALS
DIASTOLIC BLOOD PRESSURE: 68 MMHG | HEIGHT: 69 IN | RESPIRATION RATE: 18 BRPM | WEIGHT: 160.06 LBS | OXYGEN SATURATION: 99 % | TEMPERATURE: 98 F | HEART RATE: 82 BPM | SYSTOLIC BLOOD PRESSURE: 114 MMHG

## 2024-05-11 DIAGNOSIS — Z98.89 OTHER SPECIFIED POSTPROCEDURAL STATES: Chronic | ICD-10-CM

## 2024-05-11 DIAGNOSIS — R55 SYNCOPE AND COLLAPSE: ICD-10-CM

## 2024-05-11 DIAGNOSIS — K43.9 VENTRAL HERNIA WITHOUT OBSTRUCTION OR GANGRENE: Chronic | ICD-10-CM

## 2024-05-11 DIAGNOSIS — I10 ESSENTIAL (PRIMARY) HYPERTENSION: ICD-10-CM

## 2024-05-11 DIAGNOSIS — Z88.8 ALLERGY STATUS TO OTHER DRUGS, MEDICAMENTS AND BIOLOGICAL SUBSTANCES: ICD-10-CM

## 2024-05-11 DIAGNOSIS — F03.90 UNSPECIFIED DEMENTIA WITHOUT BEHAVIORAL DISTURBANCE: ICD-10-CM

## 2024-05-11 LAB
ALBUMIN SERPL ELPH-MCNC: 3.5 G/DL — SIGNIFICANT CHANGE UP (ref 3.3–5)
ALP SERPL-CCNC: 70 U/L — SIGNIFICANT CHANGE UP (ref 40–120)
ALT FLD-CCNC: 30 U/L — SIGNIFICANT CHANGE UP (ref 12–78)
ANION GAP SERPL CALC-SCNC: 8 MMOL/L — SIGNIFICANT CHANGE UP (ref 5–17)
AST SERPL-CCNC: 40 U/L — HIGH (ref 15–37)
BASOPHILS # BLD AUTO: 0.03 K/UL — SIGNIFICANT CHANGE UP (ref 0–0.2)
BASOPHILS NFR BLD AUTO: 0.7 % — SIGNIFICANT CHANGE UP (ref 0–2)
BILIRUB SERPL-MCNC: 0.9 MG/DL — SIGNIFICANT CHANGE UP (ref 0.2–1.2)
BUN SERPL-MCNC: 17 MG/DL — SIGNIFICANT CHANGE UP (ref 7–23)
CALCIUM SERPL-MCNC: 8.8 MG/DL — SIGNIFICANT CHANGE UP (ref 8.5–10.1)
CHLORIDE SERPL-SCNC: 108 MMOL/L — SIGNIFICANT CHANGE UP (ref 96–108)
CO2 SERPL-SCNC: 26 MMOL/L — SIGNIFICANT CHANGE UP (ref 22–31)
CREAT SERPL-MCNC: 1.1 MG/DL — SIGNIFICANT CHANGE UP (ref 0.5–1.3)
EGFR: 68 ML/MIN/1.73M2 — SIGNIFICANT CHANGE UP
EOSINOPHIL # BLD AUTO: 0.03 K/UL — SIGNIFICANT CHANGE UP (ref 0–0.5)
EOSINOPHIL NFR BLD AUTO: 0.7 % — SIGNIFICANT CHANGE UP (ref 0–6)
GLUCOSE SERPL-MCNC: 134 MG/DL — HIGH (ref 70–99)
HCT VFR BLD CALC: 40.2 % — SIGNIFICANT CHANGE UP (ref 39–50)
HGB BLD-MCNC: 13.1 G/DL — SIGNIFICANT CHANGE UP (ref 13–17)
IMM GRANULOCYTES NFR BLD AUTO: 0.2 % — SIGNIFICANT CHANGE UP (ref 0–0.9)
LYMPHOCYTES # BLD AUTO: 1.97 K/UL — SIGNIFICANT CHANGE UP (ref 1–3.3)
LYMPHOCYTES # BLD AUTO: 42.9 % — SIGNIFICANT CHANGE UP (ref 13–44)
MAGNESIUM SERPL-MCNC: 2.2 MG/DL — SIGNIFICANT CHANGE UP (ref 1.6–2.6)
MCHC RBC-ENTMCNC: 29.1 PG — SIGNIFICANT CHANGE UP (ref 27–34)
MCHC RBC-ENTMCNC: 32.6 G/DL — SIGNIFICANT CHANGE UP (ref 32–36)
MCV RBC AUTO: 89.3 FL — SIGNIFICANT CHANGE UP (ref 80–100)
MONOCYTES # BLD AUTO: 0.33 K/UL — SIGNIFICANT CHANGE UP (ref 0–0.9)
MONOCYTES NFR BLD AUTO: 7.2 % — SIGNIFICANT CHANGE UP (ref 2–14)
NEUTROPHILS # BLD AUTO: 2.22 K/UL — SIGNIFICANT CHANGE UP (ref 1.8–7.4)
NEUTROPHILS NFR BLD AUTO: 48.3 % — SIGNIFICANT CHANGE UP (ref 43–77)
NRBC # BLD: 0 /100 WBCS — SIGNIFICANT CHANGE UP (ref 0–0)
NT-PROBNP SERPL-SCNC: 74 PG/ML — SIGNIFICANT CHANGE UP (ref 0–450)
PLATELET # BLD AUTO: 235 K/UL — SIGNIFICANT CHANGE UP (ref 150–400)
POTASSIUM SERPL-MCNC: 4.5 MMOL/L — SIGNIFICANT CHANGE UP (ref 3.5–5.3)
POTASSIUM SERPL-SCNC: 4.5 MMOL/L — SIGNIFICANT CHANGE UP (ref 3.5–5.3)
PROT SERPL-MCNC: 7.1 GM/DL — SIGNIFICANT CHANGE UP (ref 6–8.3)
RBC # BLD: 4.5 M/UL — SIGNIFICANT CHANGE UP (ref 4.2–5.8)
RBC # FLD: 14.1 % — SIGNIFICANT CHANGE UP (ref 10.3–14.5)
SODIUM SERPL-SCNC: 142 MMOL/L — SIGNIFICANT CHANGE UP (ref 135–145)
TROPONIN I, HIGH SENSITIVITY RESULT: 6.4 NG/L — SIGNIFICANT CHANGE UP
WBC # BLD: 4.59 K/UL — SIGNIFICANT CHANGE UP (ref 3.8–10.5)
WBC # FLD AUTO: 4.59 K/UL — SIGNIFICANT CHANGE UP (ref 3.8–10.5)

## 2024-05-11 PROCEDURE — 93010 ELECTROCARDIOGRAM REPORT: CPT

## 2024-05-11 PROCEDURE — 99285 EMERGENCY DEPT VISIT HI MDM: CPT

## 2024-05-11 NOTE — ED ADULT NURSE NOTE - AS PAIN REST
Message sent to Dr. Mckee making sure he was aware of pt's high A1C of 10.4. Message shows as read but no orders or response received.   
PreOp and Medication Instructions given and reviewed:   - Verbal medication instructions (instructed to hold vitamins, herbal supplements and NSAIDS one week prior to surgery)  - NPO guidelines, unless otherwise instructed by Surgeon's Office  - Arrival place and time to be given by the Surgeon's Office   - Shower with antibacterial soap   - No makeup or moisturizer to face   - No perfume/cologne, powder, lotions, deodorant or aftershave   - Leave all jewelry, including body piercings, and valuables at home.  - Arrange for someone to drive you home following surgery; will not be allowed to leave the surgical facility alone or drive self home following sedation and anesthesia.    Pt verbalized understanding.  Pt instructed to call POC with any questions or changes.  
0 (no pain/absence of nonverbal indicators of pain)

## 2024-05-11 NOTE — ED PROVIDER NOTE - OBJECTIVE STATEMENT
78M PMH HTN, dementia BIBEMS accompanied by wife d/t syncopal episode PTA. Pt is poor historian 2/2 dementia, HPI per wife. Wife states pt ate meal, about an hour later she was watching TV, noted pt more quiet than usual, called out to him and he did not respond, saw pt w/ head slumped and not responding. Wife states lasted approximately 10min. Pt at his baseline mental status in the ED. Wife endorses hx similar episode x1 last year. Pt w/o recent fever, cough, vomiting, diarrhea, foul-smelling urine.     PMH as above, PSH none, Allergy ACE-I, Meds as listed. 78M PMH HTN, dementia BIBEMS accompanied by wife d/t syncopal episode PTA. Pt is poor historian 2/2 dementia, HPI per wife. Wife states pt ate dinner meal, about an hour later she was watching TV, noted pt more quiet than usual, called out to him and he did not respond, saw pt w/ head slumped and not responding. Wife states lasted approximately 10min. Pt at his baseline mental status in the ED. Wife endorses hx similar episode x1 last year. Pt w/o recent fever, cough, vomiting, diarrhea, foul-smelling urine.     PMH as above, PSH none, Allergy ACE-I, Meds as listed.

## 2024-05-11 NOTE — ED ADULT TRIAGE NOTE - GLASGOW COMA SCALE: EYE OPENING, MLM
(E4) spontaneous Medical Necessity Statement: Based on my medical judgement, Mohs surgery is the most appropriate treatment.  for this cancer compared to other treatments.

## 2024-05-11 NOTE — ED PROVIDER NOTE - PATIENT PORTAL LINK FT
You can access the FollowMyHealth Patient Portal offered by [facility] by registering at the following website: [email or web address]. By joining [facility]’s FollowMyHealth portal, you will also be able to view your health information using other applications (apps) compatible with our system.

## 2024-05-11 NOTE — ED ADULT NURSE NOTE - OBJECTIVE STATEMENT
Pt BIBA for syncopal episode lasting around 3 minutes. Pt AOx2, at baseline normal per wife. Pt was seated when he syncopized, denies head strike/fall. Pt denies any complaints at this time but is a poor historian. Wife reports pt had a similar episode in August. 18g placed to L AC by EMS,  in triage. Hx HTN, dementia. Pt placed on continuous SPO2 and cardiac monitoring, EKG completed.

## 2024-05-11 NOTE — ED PROVIDER NOTE - CLINICAL SUMMARY MEDICAL DECISION MAKING FREE TEXT BOX
78M PMH HTN, dementia BIBEMS accompanied by wife d/t syncopal episode PTA. Afebrile, VSS. Well appearing, in NAD. ECG NSR w/ occasional PVCs. Plan for CBC, CMP, mag, trop, BNP, CXR. Re-eval. 78M PMH HTN, dementia BIBEMS accompanied by wife d/t syncopal episode PTA. Afebrile, VSS. Well appearing, in NAD. ECG NSR w/ occasional PVCs. Plan for CBC, CMP, mag, trop, BNP, CXR. Re-eval.  W/u w/o significant abnormalities: HEART score 3, low risk. On re-eval, resting comfortably. Wife at bedside. Stable for d/c home. Instructed for close outpatient PCP f/u. Return signs / symptoms d/w pt, wife. They understand / agree w/ this plan.

## 2024-05-11 NOTE — ED ADULT TRIAGE NOTE - CHIEF COMPLAINT QUOTE
BIBA,  syncope, per EMS, pt became unresponsive for approx 3 minutes.  pt aaox2 in triage.   in triage.    per wife, pt is at baseline normal   (PMH- htn, dementia,)

## 2024-05-11 NOTE — ED ADULT TRIAGE NOTE - TEMPERATURE IN CELSIUS (DEGREES C)
Discharge instructions given. Patient verbalized understanding. Return to [address] [place] [address],[address] in 1 week(s).   Awaiting Bypass schedule 36.4

## 2024-05-11 NOTE — ED ADULT NURSE NOTE - NSFALLRISKINTERV_ED_ALL_ED
Assistance OOB with selected safe patient handling equipment if applicable/Communicate fall risk and risk factors to all staff, patient, and family/Orthostatic vital signs/Provide visual cue: yellow wristband, yellow gown, etc/Reinforce activity limits and safety measures with patient and family/Call bell, personal items and telephone in reach/Instruct patient to call for assistance before getting out of bed/chair/stretcher/Non-slip footwear applied when patient is off stretcher/[place] to call system/Physically safe environment - no spills, clutter or unnecessary equipment/Purposeful Proactive Rounding/Room/bathroom lighting operational, light cord in reach

## 2024-05-11 NOTE — ED PROVIDER NOTE - PHYSICAL EXAMINATION
GEN: Awake, alert, interactive, NAD.  HEAD AND NECK: NC/AT. Airway patent. Neck supple.   EYES:  Clear b/l. EOMI. PERRL.   ENT: Moist mucus membranes.   CARDIAC: Regular rate, regular rhythm. No evident pedal edema.    RESP/CHEST: Normal respiratory effort with no use of accessory muscles or retractions. Clear throughout on auscultation.  ABD: Soft, non-distended, non-tender. No rebound, no guarding.   BACK: No midline spinal TTP. No CVAT.   EXTREMITIES: Moving all extremities with no apparent deformities.   SKIN: Warm, dry, intact normal color. No rash.   NEURO: AOx2, CN II-XII grossly intact, no focal deficits.   PSYCH: Appropriate mood and affect.

## 2024-05-12 VITALS
RESPIRATION RATE: 18 BRPM | DIASTOLIC BLOOD PRESSURE: 71 MMHG | HEART RATE: 81 BPM | OXYGEN SATURATION: 98 % | TEMPERATURE: 98 F | SYSTOLIC BLOOD PRESSURE: 115 MMHG

## 2024-05-12 LAB
APPEARANCE UR: ABNORMAL
BACTERIA # UR AUTO: ABNORMAL /HPF
BILIRUB UR-MCNC: NEGATIVE — SIGNIFICANT CHANGE UP
COLOR SPEC: YELLOW — SIGNIFICANT CHANGE UP
COMMENT - URINE: SIGNIFICANT CHANGE UP
DIFF PNL FLD: NEGATIVE — SIGNIFICANT CHANGE UP
EPI CELLS # UR: PRESENT
GLUCOSE UR QL: NEGATIVE MG/DL — SIGNIFICANT CHANGE UP
KETONES UR-MCNC: NEGATIVE MG/DL — SIGNIFICANT CHANGE UP
LEUKOCYTE ESTERASE UR-ACNC: NEGATIVE — SIGNIFICANT CHANGE UP
NITRITE UR-MCNC: NEGATIVE — SIGNIFICANT CHANGE UP
PH UR: 5.5 — SIGNIFICANT CHANGE UP (ref 5–8)
PROT UR-MCNC: SIGNIFICANT CHANGE UP MG/DL
RBC CASTS # UR COMP ASSIST: 3 /HPF — SIGNIFICANT CHANGE UP (ref 0–4)
SP GR SPEC: 1.02 — SIGNIFICANT CHANGE UP (ref 1–1.03)
UROBILINOGEN FLD QL: 0.2 MG/DL — SIGNIFICANT CHANGE UP (ref 0.2–1)
WBC UR QL: 1 /HPF — SIGNIFICANT CHANGE UP (ref 0–5)

## 2024-05-12 PROCEDURE — 70450 CT HEAD/BRAIN W/O DYE: CPT | Mod: 26,MC

## 2024-05-12 PROCEDURE — 71250 CT THORAX DX C-: CPT | Mod: 26,MC

## 2024-05-12 PROCEDURE — 71045 X-RAY EXAM CHEST 1 VIEW: CPT | Mod: 26

## 2024-05-13 LAB
CULTURE RESULTS: SIGNIFICANT CHANGE UP
SPECIMEN SOURCE: SIGNIFICANT CHANGE UP

## 2024-06-10 ENCOUNTER — APPOINTMENT (OUTPATIENT)
Dept: ELECTROPHYSIOLOGY | Facility: CLINIC | Age: 79
End: 2024-06-10

## 2024-07-09 ENCOUNTER — APPOINTMENT (OUTPATIENT)
Dept: ELECTROPHYSIOLOGY | Facility: CLINIC | Age: 79
End: 2024-07-09

## 2024-08-19 ENCOUNTER — APPOINTMENT (OUTPATIENT)
Dept: ELECTROPHYSIOLOGY | Facility: CLINIC | Age: 79
End: 2024-08-19

## 2024-09-23 ENCOUNTER — APPOINTMENT (OUTPATIENT)
Dept: ELECTROPHYSIOLOGY | Facility: CLINIC | Age: 79
End: 2024-09-23

## 2024-10-28 ENCOUNTER — APPOINTMENT (OUTPATIENT)
Dept: ELECTROPHYSIOLOGY | Facility: CLINIC | Age: 79
End: 2024-10-28

## 2025-03-23 ENCOUNTER — INPATIENT (INPATIENT)
Facility: HOSPITAL | Age: 80
LOS: 6 days | Discharge: HOME | End: 2025-03-30
Attending: INTERNAL MEDICINE | Admitting: INTERNAL MEDICINE
Payer: MEDICARE

## 2025-03-23 VITALS
HEART RATE: 87 BPM | DIASTOLIC BLOOD PRESSURE: 95 MMHG | WEIGHT: 154.32 LBS | TEMPERATURE: 99 F | OXYGEN SATURATION: 100 % | RESPIRATION RATE: 18 BRPM | SYSTOLIC BLOOD PRESSURE: 175 MMHG

## 2025-03-23 DIAGNOSIS — R09.89 OTHER SPECIFIED SYMPTOMS AND SIGNS INVOLVING THE CIRCULATORY AND RESPIRATORY SYSTEMS: ICD-10-CM

## 2025-03-23 DIAGNOSIS — E78.5 HYPERLIPIDEMIA, UNSPECIFIED: ICD-10-CM

## 2025-03-23 DIAGNOSIS — Z98.89 OTHER SPECIFIED POSTPROCEDURAL STATES: Chronic | ICD-10-CM

## 2025-03-23 DIAGNOSIS — K43.9 VENTRAL HERNIA WITHOUT OBSTRUCTION OR GANGRENE: Chronic | ICD-10-CM

## 2025-03-23 DIAGNOSIS — G30.9 ALZHEIMER'S DISEASE, UNSPECIFIED: ICD-10-CM

## 2025-03-23 LAB
ALBUMIN SERPL ELPH-MCNC: 3.8 G/DL — SIGNIFICANT CHANGE UP (ref 3.3–5)
ALP SERPL-CCNC: 86 U/L — SIGNIFICANT CHANGE UP (ref 40–120)
ALT FLD-CCNC: 25 U/L — SIGNIFICANT CHANGE UP (ref 12–78)
ANION GAP SERPL CALC-SCNC: 7 MMOL/L — SIGNIFICANT CHANGE UP (ref 5–17)
APPEARANCE UR: CLEAR — SIGNIFICANT CHANGE UP
APTT BLD: 35.2 SEC — SIGNIFICANT CHANGE UP (ref 24.5–35.6)
AST SERPL-CCNC: 22 U/L — SIGNIFICANT CHANGE UP (ref 15–37)
BASOPHILS # BLD AUTO: 0.02 K/UL — SIGNIFICANT CHANGE UP (ref 0–0.2)
BASOPHILS NFR BLD AUTO: 0.4 % — SIGNIFICANT CHANGE UP (ref 0–2)
BILIRUB SERPL-MCNC: 1.2 MG/DL — SIGNIFICANT CHANGE UP (ref 0.2–1.2)
BILIRUB UR-MCNC: NEGATIVE — SIGNIFICANT CHANGE UP
BUN SERPL-MCNC: 11 MG/DL — SIGNIFICANT CHANGE UP (ref 7–23)
CALCIUM SERPL-MCNC: 9.4 MG/DL — SIGNIFICANT CHANGE UP (ref 8.5–10.1)
CHLORIDE SERPL-SCNC: 103 MMOL/L — SIGNIFICANT CHANGE UP (ref 96–108)
CO2 SERPL-SCNC: 28 MMOL/L — SIGNIFICANT CHANGE UP (ref 22–31)
COLOR SPEC: YELLOW — SIGNIFICANT CHANGE UP
CREAT SERPL-MCNC: 0.82 MG/DL — SIGNIFICANT CHANGE UP (ref 0.5–1.3)
DIFF PNL FLD: NEGATIVE — SIGNIFICANT CHANGE UP
EGFR: 89 ML/MIN/1.73M2 — SIGNIFICANT CHANGE UP
EGFR: 89 ML/MIN/1.73M2 — SIGNIFICANT CHANGE UP
EOSINOPHIL # BLD AUTO: 0.03 K/UL — SIGNIFICANT CHANGE UP (ref 0–0.5)
EOSINOPHIL NFR BLD AUTO: 0.6 % — SIGNIFICANT CHANGE UP (ref 0–6)
GLUCOSE SERPL-MCNC: 101 MG/DL — HIGH (ref 70–99)
GLUCOSE UR QL: NEGATIVE MG/DL — SIGNIFICANT CHANGE UP
HCT VFR BLD CALC: 43.2 % — SIGNIFICANT CHANGE UP (ref 39–50)
HGB BLD-MCNC: 14.1 G/DL — SIGNIFICANT CHANGE UP (ref 13–17)
IMM GRANULOCYTES NFR BLD AUTO: 0.2 % — SIGNIFICANT CHANGE UP (ref 0–0.9)
INR BLD: 1 RATIO — SIGNIFICANT CHANGE UP (ref 0.85–1.16)
KETONES UR-MCNC: NEGATIVE MG/DL — SIGNIFICANT CHANGE UP
LEUKOCYTE ESTERASE UR-ACNC: NEGATIVE — SIGNIFICANT CHANGE UP
LYMPHOCYTES # BLD AUTO: 2.14 K/UL — SIGNIFICANT CHANGE UP (ref 1–3.3)
LYMPHOCYTES # BLD AUTO: 42.7 % — SIGNIFICANT CHANGE UP (ref 13–44)
MCHC RBC-ENTMCNC: 28.8 PG — SIGNIFICANT CHANGE UP (ref 27–34)
MCHC RBC-ENTMCNC: 32.6 G/DL — SIGNIFICANT CHANGE UP (ref 32–36)
MCV RBC AUTO: 88.3 FL — SIGNIFICANT CHANGE UP (ref 80–100)
MONOCYTES # BLD AUTO: 0.35 K/UL — SIGNIFICANT CHANGE UP (ref 0–0.9)
MONOCYTES NFR BLD AUTO: 7 % — SIGNIFICANT CHANGE UP (ref 2–14)
NEUTROPHILS # BLD AUTO: 2.46 K/UL — SIGNIFICANT CHANGE UP (ref 1.8–7.4)
NEUTROPHILS NFR BLD AUTO: 49.1 % — SIGNIFICANT CHANGE UP (ref 43–77)
NITRITE UR-MCNC: NEGATIVE — SIGNIFICANT CHANGE UP
NRBC BLD AUTO-RTO: 0 /100 WBCS — SIGNIFICANT CHANGE UP (ref 0–0)
PH UR: 7.5 — SIGNIFICANT CHANGE UP (ref 5–8)
PLATELET # BLD AUTO: 255 K/UL — SIGNIFICANT CHANGE UP (ref 150–400)
POTASSIUM SERPL-MCNC: 4.1 MMOL/L — SIGNIFICANT CHANGE UP (ref 3.5–5.3)
POTASSIUM SERPL-SCNC: 4.1 MMOL/L — SIGNIFICANT CHANGE UP (ref 3.5–5.3)
PROT SERPL-MCNC: 7.8 GM/DL — SIGNIFICANT CHANGE UP (ref 6–8.3)
PROT UR-MCNC: NEGATIVE MG/DL — SIGNIFICANT CHANGE UP
PROTHROM AB SERPL-ACNC: 11.6 SEC — SIGNIFICANT CHANGE UP (ref 9.9–13.4)
RBC # BLD: 4.89 M/UL — SIGNIFICANT CHANGE UP (ref 4.2–5.8)
RBC # FLD: 14.9 % — HIGH (ref 10.3–14.5)
SODIUM SERPL-SCNC: 138 MMOL/L — SIGNIFICANT CHANGE UP (ref 135–145)
SP GR SPEC: >1.03 — HIGH (ref 1–1.03)
TROPONIN I, HIGH SENSITIVITY RESULT: 5.9 NG/L — SIGNIFICANT CHANGE UP
UROBILINOGEN FLD QL: 0.2 MG/DL — SIGNIFICANT CHANGE UP (ref 0.2–1)
WBC # BLD: 5.01 K/UL — SIGNIFICANT CHANGE UP (ref 3.8–10.5)
WBC # FLD AUTO: 5.01 K/UL — SIGNIFICANT CHANGE UP (ref 3.8–10.5)

## 2025-03-23 PROCEDURE — 0042T: CPT

## 2025-03-23 PROCEDURE — 70496 CT ANGIOGRAPHY HEAD: CPT | Mod: 26

## 2025-03-23 PROCEDURE — 99222 1ST HOSP IP/OBS MODERATE 55: CPT

## 2025-03-23 PROCEDURE — 70498 CT ANGIOGRAPHY NECK: CPT | Mod: 26

## 2025-03-23 PROCEDURE — 70450 CT HEAD/BRAIN W/O DYE: CPT | Mod: 26,XU

## 2025-03-23 PROCEDURE — 71045 X-RAY EXAM CHEST 1 VIEW: CPT | Mod: 26

## 2025-03-23 PROCEDURE — 99285 EMERGENCY DEPT VISIT HI MDM: CPT

## 2025-03-23 RX ORDER — ONDANSETRON HCL/PF 4 MG/2 ML
4 VIAL (ML) INJECTION EVERY 8 HOURS
Refills: 0 | Status: DISCONTINUED | OUTPATIENT
Start: 2025-03-23 | End: 2025-03-30

## 2025-03-23 RX ORDER — ACETAMINOPHEN 500 MG/5ML
650 LIQUID (ML) ORAL EVERY 6 HOURS
Refills: 0 | Status: DISCONTINUED | OUTPATIENT
Start: 2025-03-23 | End: 2025-03-30

## 2025-03-23 RX ORDER — MEMANTINE HYDROCHLORIDE 21 MG/1
10 CAPSULE, EXTENDED RELEASE ORAL
Refills: 0 | Status: DISCONTINUED | OUTPATIENT
Start: 2025-03-23 | End: 2025-03-30

## 2025-03-23 RX ORDER — MELATONIN 5 MG
3 TABLET ORAL AT BEDTIME
Refills: 0 | Status: DISCONTINUED | OUTPATIENT
Start: 2025-03-23 | End: 2025-03-30

## 2025-03-23 RX ORDER — ASPIRIN 325 MG
300 TABLET ORAL ONCE
Refills: 0 | Status: COMPLETED | OUTPATIENT
Start: 2025-03-23 | End: 2025-03-23

## 2025-03-23 RX ORDER — MEMANTINE HYDROCHLORIDE 21 MG/1
1 CAPSULE, EXTENDED RELEASE ORAL
Refills: 0 | DISCHARGE

## 2025-03-23 RX ORDER — DONEPEZIL HYDROCHLORIDE 5 MG/1
10 TABLET ORAL AT BEDTIME
Refills: 0 | Status: DISCONTINUED | OUTPATIENT
Start: 2025-03-23 | End: 2025-03-30

## 2025-03-23 RX ORDER — ATORVASTATIN CALCIUM 80 MG/1
10 TABLET, FILM COATED ORAL AT BEDTIME
Refills: 0 | Status: DISCONTINUED | OUTPATIENT
Start: 2025-03-23 | End: 2025-03-25

## 2025-03-23 RX ORDER — OLANZAPINE 10 MG/1
5 TABLET ORAL ONCE
Refills: 0 | Status: COMPLETED | OUTPATIENT
Start: 2025-03-23 | End: 2025-03-23

## 2025-03-23 RX ORDER — LORAZEPAM 4 MG/ML
1 VIAL (ML) INJECTION ONCE
Refills: 0 | Status: DISCONTINUED | OUTPATIENT
Start: 2025-03-23 | End: 2025-03-23

## 2025-03-23 RX ORDER — DONEPEZIL HYDROCHLORIDE 5 MG/1
1 TABLET ORAL
Refills: 0 | DISCHARGE

## 2025-03-23 RX ORDER — ASPIRIN 325 MG
324 TABLET ORAL ONCE
Refills: 0 | Status: DISCONTINUED | OUTPATIENT
Start: 2025-03-23 | End: 2025-03-23

## 2025-03-23 RX ORDER — MAGNESIUM, ALUMINUM HYDROXIDE 200-200 MG
30 TABLET,CHEWABLE ORAL EVERY 4 HOURS
Refills: 0 | Status: DISCONTINUED | OUTPATIENT
Start: 2025-03-23 | End: 2025-03-30

## 2025-03-23 RX ADMIN — OLANZAPINE 5 MILLIGRAM(S): 10 TABLET ORAL at 18:57

## 2025-03-23 RX ADMIN — Medication 1 MILLIGRAM(S): at 16:19

## 2025-03-23 RX ADMIN — Medication 300 MILLIGRAM(S): at 18:57

## 2025-03-23 NOTE — H&P ADULT - NSHPPHYSICALEXAM_GEN_ALL_CORE
T(C): 36.9 (03-23-25 @ 19:15), Max: 37.2 (03-23-25 @ 16:54)  HR: 119 (03-23-25 @ 19:15) (62 - 119)  BP: 121/80 (03-23-25 @ 19:15) (121/80 - 175/78)  RR: 20 (03-23-25 @ 19:15) (15 - 20)  SpO2: 96% (03-23-25 @ 19:15) (95% - 100%)    CONSTITUTIONAL: Well groomed, no apparent distress  EYES: PERRLA and symmetric, EOMI  RESP: No respiratory distress, no use of accessory muscles; CTA b/l  CV: RRR  GI: Soft, NT, ND  NEURO: intermittently alert, unable to perform neurological exam at this time, mild L. facial droop noted T(C): 36.9 (03-23-25 @ 19:15), Max: 37.2 (03-23-25 @ 16:54)  HR: 119 (03-23-25 @ 19:15) (62 - 119)  BP: 121/80 (03-23-25 @ 19:15) (121/80 - 175/78)  RR: 20 (03-23-25 @ 19:15) (15 - 20)  SpO2: 96% (03-23-25 @ 19:15) (95% - 100%)    CONSTITUTIONAL: Well groomed, no apparent distress  EYES: PERRLA and symmetric, EOMI  RESP: No respiratory distress, no use of accessory muscles; CTA b/l  CV: RRR  GI: Soft, NT, ND  NEURO: intermittently alert, unable to perform neurological exam at this time, mild L. facial droop noted, moving all extremities at this time

## 2025-03-23 NOTE — ED PROVIDER NOTE - RESPIRATORY NORMAL BREATH SOUNDS
RIGHT UPPER LOBE/RIGHT MIDDLE LOBE/RIGHT LOWER LOBE/LEFT UPPER LOBE/LEFT LOWER LOBE

## 2025-03-23 NOTE — ED PROVIDER NOTE - CPE EDP MUSC NORM
Anesthesia Post Evaluation    Patient: Toni Hyde    Procedure(s) Performed: Procedure(s) (LRB):  BIOPSY, MASS, SPINE, LUMBAR (N/A)    Final Anesthesia Type: general      Patient location during evaluation: PACU  Patient participation: Yes- Able to Participate  Level of consciousness: awake and alert and oriented  Post-procedure vital signs: reviewed and stable  Pain management: adequate  Airway patency: patent  CONCEPCIÓN mitigation strategies: Multimodal analgesia, Extubation while patient is awake, Verification of full reversal of neuromuscular block and Extubation and recovery carried out in lateral, semiupright, or other nonsupine position  PONV status at discharge: No PONV  Anesthetic complications: no      Cardiovascular status: blood pressure returned to baseline  Respiratory status: unassisted, spontaneous ventilation and room air  Hydration status: euvolemic  Follow-up not needed.          Vitals Value Taken Time   /64 10/20/22 0825   Temp 36.6 °C (97.9 °F) 10/20/22 0825   Pulse 68 10/20/22 0825   Resp 18 10/20/22 0825   SpO2 92 % 10/20/22 1035         Event Time   Out of Recovery 10/19/2022 18:40:00         Pain/M[place]ika Score: No data recorded       normal...

## 2025-03-23 NOTE — ED ADULT TRIAGE NOTE - CHIEF COMPLAINT QUOTE
BIBA as per wife, patient last known well 11:30pm, patient woke up and noticed slurred speech and left arm weakness 11:30am. Patient was ambulating at this time. Drooling on the left side in triage. F/S 75. Hx HTN, HLD,

## 2025-03-23 NOTE — ED ADULT NURSE REASSESSMENT NOTE - NS ED NURSE REASSESS COMMENT FT1
Patient received ativan 1mg via IV due to being combative and pulling at lines. Pt noted with no change to behavior. Noted hitting and attempting to kick staff. Pt tried to be redirected but only become more agitative. MD aware.

## 2025-03-23 NOTE — H&P ADULT - NSHPLABSRESULTS_GEN_ALL_CORE
14.1   5.01  )-----------( 255      ( 23 Mar 2025 15:13 )             43.2     138  |  103  |  11  ----------------------------<  101[H]     03-23  4.1   |  28  |  0.82    Ca    9.4      23 Mar 2025 15:13    TPro  7.8  /  Alb  3.8  /  TBili  1.2  /  DBili  x   /  AST  22  /  ALT  25  /  AlkPhos  86  03-23    PT/INR: 11.6/1.00 (03-23-25 @ 15:13)  PTT: 35.2 (03-23-25 @ 15:13)    Urinalysis Basic - ( 23 Mar 2025 15:56 )  Color: Yellow / Appearance: Clear / SG: >1.030 / pH: 7.5  Gluc: Negative mg/dL / Ketone: Negative mg/dL  / Bili: Negative / Urobili: 0.2 mg/dL   Blood: Negative / Protein: Negative mg/dL / Nitrite: Negative   Leuk Esterase: Negative / RBC: x / WBC x   Sq Epi: x / Bacteria: x  Hyaline Casts: x/WBC Casts: x    Urinalysis with Rflx Culture (collected 23 Mar 2025 15:56)    CT brain stroke protocol 3/23/25  IMPRESSION:  No acute intracranial findings.    Findings discussed with Dr. Werner of the patient's clinical team on 3/23/2025 at 3:12 PM.    CTA brain stroke protocol with IV contrast 3/23/25  IMPRESSION:  No evidence of vascular stenosis, occlusion, aneurysm or vascular malformation.    CTA neck stroke protocol with IV contrast 3/23/25  IMPRESSION:  No vessel narrowing or occlusion in the neck.    CT brain perfusion maps stroke 3/23/25  IMPRESSION:  Mismatch perfusion defect in the left cerebral hemisphere suggestive of ischemia. Mismatch volume: 42 mL.    Matched perfusion defect in the right cerebellar hemisphere.    MRI brain would be helpful for further assessment.    Chest x-ray 3/23/25

## 2025-03-23 NOTE — H&P ADULT - ASSESSMENT
Jordy Hester is an 79 year old male with PMHx of HLD and Alzheimer's dementia who presented to the ED on 3/23/25 for complaints of inability to move left hand and admitted for TIA vs. CVA.    TIA vs. CVA  Wife last saw patient at baseline self around 11:30PM last night  Wife states patient was speaking in 1-2 words when he usually speaks in complete sentences and she noticed he was not moving his left hand around 11AM  NIHSS 11 as per ER physician documentation, not TNK candidate given outside the window for thrombolytic therapy  CT head without acute intracranial findings  CTA brain without vascular stenosis, occlusion, aneurysm or vascular malformation  CTA neck without vessel narrowing or occlusion in the neck  CT brain perfusion with mismatch perfusion defect in the L. cerebral hemisphere suggestive of ischemia, mismatch volume: 42 mL, and matched perfusion defect in the R. cerebellar hemisphere  Neuro checks q4, NPO until SLP eval given failed bedside dysphagia eval as per ER RN   mg suppository x 1 ordered, VTE ppx with SCDs for now  Allowing for permissive HTN up to 220/120 for first 24-48 hours  F/u TSH, lipid panel, A1c for risk stratification  F/u echocardiogram with bubble study, MRI brain  PT/OT consulted  Telemetry to monitor for arrhythmias  Pending official neurology consult - please f/u in AM      Chronic medical conditions:  HLD: PTA simvastatin 20 mg qhs reordered as atorvastatin 10 mg qhs given simvastatin not on formulary  Alzheimer's dementia: PTA donepezil 10 mg qhs, memantine 10 mg BID, frequent turning and repositioning q2    Medication reconciliation completed using med list provided by wife over the phone.    Plan of care discussed with wife, Kacy over the phone. Wife is requesting for day team to contact daughter, Dr. Tanya Hester (anesthesiologist, [phone]) for clinical updates "since she understands medical conditions."

## 2025-03-23 NOTE — ED ADULT NURSE NOTE - NSFALLHARMRISKINTERV_ED_ALL_ED
Patient is non-MH PCP managed. Facility/PCP to monitor. Or pt has NO PCP. Did not enroll in [facility].   
Assistance OOB with selected safe patient handling equipment if applicable/Assistance with ambulation/Communicate risk of Fall with Harm to all staff, patient, and family/Monitor gait and stability/Monitor for mental status changes and reorient to person, place, and time, as needed/Move patient closer to nursing station/within visual sight of ED staff/Provide patient with walking aids/Provide visual cue: red socks, yellow wristband, yellow gown, etc/Reinforce activity limits and safety measures with patient and family/Toileting schedule using arm’s reach rule for commode and bathroom/Use of alarms - bed, stretcher, chair and/or video monitoring/Bed in lowest position, wheels locked, appropriate side rails in place/Call bell, personal items and telephone in reach/Instruct patient to call for assistance before getting out of bed/chair/stretcher/Non-slip footwear applied when patient is off stretcher/[place] to call system/Physically safe environment - no spills, clutter or unnecessary equipment/Purposeful Proactive Rounding/Room/bathroom lighting operational, light cord in reach

## 2025-03-23 NOTE — H&P ADULT - HISTORY OF PRESENT ILLNESS
Jordy Hester is an 79 year old male with PMHx of HLD and Alzheimer's dementia who presented to the ED on 3/23/25 for complaints of inability to move left hand.    History primarily obtained from wife, Kacy Gonzalez ([phone]) as patient is unable to provide history due to dementia. As per wife, they stayed up late to watch a movie last night and patient fell asleep around 11:30PM. Wife sleeps in a different room than him but noticed patient was not up yet past 10AM this morning which is atypical for him. She did not think much of it and just let him sleep. Then when patient woke up around 11AM, wife noticed he was wearing different clothes than when he went to sleep. Upon wife questioning him when he changes his clothes, patient was unable to respond to her. Wife states he was speaking in 1-2 words when he usually speaks in complete sentences. Then she noticed his left hand was not moving. Brought to the ER for further evaluation. Baseline functional status is ambulates unassisted and independent with most ADLs. Requires assistance with showering. Lives at home with wife, daughter, and son-in-law.    In the ED, VSS except BP as elevated as 175/95. Labs grossly unremarkable. U/A unremarkable. CT head without acute intracranial findings. CTA brain without vascular stenosis, occlusion, aneurysm or vascular malformation. CTA neck without vessel narrowing or occlusion in the neck. CT brain perfusion with mismatch perfusion defect in the left cerebral hemisphere suggestive of ischemia. Mismatch volume: 42 mL. Matched perfusion defect in the right cerebellar hemisphere. Received lorazepam 1 mg IV. ER physician contacted neurology who will see patient in AM. Jordy Hester is an 79 year old male with PMHx of HLD and Alzheimer's dementia who presented to the ED on 3/23/25 for complaints of inability to move left hand.    History primarily obtained from wife, Kacy Gonzalez ([phone]) as patient is unable to provide history due to dementia. As per wife, they stayed up late to watch a movie last night and patient fell asleep around 11:30PM. Wife sleeps in a different room than him but noticed patient was not up yet past 10AM this morning which is atypical for him. She did not think much of it and just let him sleep. Then when patient woke up around 11AM, wife noticed he was wearing different clothes than when he went to sleep. Upon wife questioning him when he changed his clothes, patient was not responding appropriately. Wife states he was speaking in 1-2 words when he usually speaks in complete sentences. Then she noticed his left hand was not moving. Brought to the ER for further evaluation. Baseline functional status is ambulates unassisted and independent with most ADLs. Requires assistance with showering. Lives at home with wife, daughter, and son-in-law.    In the ED, VSS except BP as elevated as 175/95. Labs grossly unremarkable. U/A unremarkable. CT head without acute intracranial findings. CTA brain without vascular stenosis, occlusion, aneurysm or vascular malformation. CTA neck without vessel narrowing or occlusion in the neck. CT brain perfusion with mismatch perfusion defect in the left cerebral hemisphere suggestive of ischemia. Mismatch volume: 42 mL. Matched perfusion defect in the right cerebellar hemisphere. Received lorazepam 1 mg IV. ER physician contacted neurology who will see patient in AM.

## 2025-03-23 NOTE — ED ADULT NURSE NOTE - OBJECTIVE STATEMENT
Patient alert and verbally responsive, as per wife, patient last known well 11:30pm, patient woke up and noticed slurred speech with left arm weakness 11:30am.  Hx HTN, HLD, Dementia. Patient alert and verbally responsive, as per wife, patient last known well 11:30pm, patient woke up and noticed slurred speech with left arm weakness 11:30am. Patient noted very combative and refusing to listen orders placed for safety  Hx HTN, HLD, Dementia.

## 2025-03-23 NOTE — ED PROVIDER NOTE - CONSTITUTIONAL, MLM
Well appearing, awake, situation and in no apparent distress. no nasal flaring no shoulders retractions no diaphoresis normal...

## 2025-03-23 NOTE — ED PROVIDER NOTE - TRANSFER CONSULTATION #1
"Mother states, \"She is very congested and has a cough. No fever or other symptoms except taking less at her feedings, not sleeping well and spitting up mucus. I'd like her to be seen. \"    Reviewed supportive care for cough including increasing fluids, warm liquids, NSS and sx, humidifier and raising the head of the bed.  Call [facility] for worsening or concerns, take pt to ER for increased rate or effort breathing or for no urine in more then 8 hours.  Mother verbalized understanding of and agreement with instructions.     Appointment tomorrow 1045  "
Cough  Congestion  No fever     
will see patient in ED

## 2025-03-23 NOTE — GOALS OF CARE CONVERSATION - ADVANCED CARE PLANNING - CONVERSATION DETAILS
Code status is DNR/DNI but ok to use NIPPV. Would want IV fluids and IV antibiotics to treat infections. Would not want a feeding tube or dialysis. Would want to be sent to the hospital when medically necessary. . Decision maker is wife, Kacy Gonzalez ([phone]) as patient is unable to make his own medical decisions due to Alzheimer's dementia. MOLST fill out via telephone conversation with wife and placed in chart. This conversation was witnessed by ER physician, Dr. Werner.

## 2025-03-23 NOTE — ED PROVIDER NOTE - CLINICAL SUMMARY MEDICAL DECISION MAKING FREE TEXT BOX
79 years old male by ems with daughter c/o pt was not moving his left arm while pt was walking and appears confused and slurred speech at 11:45 am this morning. She sts pt was last seen at [facility] at 23:30 pm last night. She sts pt has a hx of stage 4 Alzheimer dz. NIH score is 11. code stroke was called pt is not a candidate for TNK due to last seen at [facility] at 23:30 pm last night.

## 2025-03-23 NOTE — ED ADULT NURSE NOTE - FINAL NURSING ELECTRONIC SIGNATURE
PT DAILY TREATMENT NOTE 8    Patient Name: Pool Patricia  Date:2017  : 1943  [x]  Patient  Verified  [place]or: [place] / Plan: [place] MEDICARE PART A & B / Product Type: Medicare /    In time:1203 Out time:102  Total Treatment Time (min): 59  Total Timed Codes (min): 49  1:1 Treatment Time (min): 49  Visit #: 1 of 2    Treatment Area: Right ankle pain [M25.571]    SUBJECTIVE  Pain Level (0-10 scale): 5  Any medication changes, allergies to medications, adverse drug reactions, diagnosis change, or new procedure performed?: [x] No    [] Yes (see summary sheet for update)  Subjective functional status/changes:   [] No changes reported  \"I didn't put my cream on this morning but I am walking better. \"    OBJECTIVE  Modality rationale: decrease edema, decrease inflammation and decrease pain to improve the patients ability to ambulate, perform stairs   Min Type Additional Details    [] Estim: []Att   []Unatt        []TENS instruct                  []IFC  []Premod   []NMES                     []Other:  []w/US   []w/ice   []w/heat  Position:  Location:    []  Traction: [] Cervical       []Lumbar                       [] Prone          []Supine                       []Intermittent   []Continuous Lbs:  [] before manual  [] after manual   H!  [x]  Ultrasound: []Continuous   [x] Pulsed                           []1MHz   [x]3MHz Location: R achilles insertion   W/cm2: 1.3    []  Iontophoresis with dexamethasone         Location: [] Take home patch   [] In clinic    []  Ice     []  heat  []  Ice massage Position:  Location:   15 [x]  Vasopneumatic Device with LE elevated  Pressure:       [] lo [] med [x] hi   Temperature: [x] lo [] med [] hi   [x] Skin assessment post-treatment:  [x]intact []redness- no adverse reaction       []redness  adverse reaction:       40 min Therapeutic Exercise:  [x] See flow sheet : REASSESS , AIDED PATIENT IN FOTO COMPLETION    Rationale: increase ROM, increase strength and improve coordination to improve the patients ability to improve gait, ADLs, IADL's    9 min Manual Therapy: roller to [address], manual DF mobs grade 3-4    Rationale: decrease pain, increase ROM, increase tissue extensibility and decrease trigger points to improve mobility          x min Patient Education: [x] Review HEP         Other Objective/Functional Measures:    Goals assessed for PN/DC (NV)  Pain at best 0 in am, at worst 7  Subjective % improvement 70%  Objective: Ankle AROM : DF 9, PF 50 IV 36,EV 50   Ankle strength : DF 5, PF 4 IV 5,EV 5   Gait : improved heel strike and keny, decreased antalgia    SR 30 sec B    SLS 10-15 sec without UE support intermittently   Improvements: self managing with cream, TENs and HEP, 15-20 min walking tolerance    Deficits none specifically noted other than prolonged walking     Pain Level (0-10 scale) post treatment: 3    ASSESSMENT/Changes in Function:   SEE PN/ DC     Patient will continue to benefit from skilled PT services to modify and progress therapeutic interventions, address functional mobility deficits, address ROM deficits, address strength deficits, analyze and address soft tissue restrictions, analyze and cue movement patterns, analyze and modify body mechanics/ergonomics, assess and modify postural abnormalities, address imbalance/dizziness and instruct in home and community integration to attain remaining goals. []  See Plan of Care  [x]  See progress note/recertification 5/69/78  []  See Discharge Summary         Progress towards goals / Updated goals:   New Goals to be achieved in __8-10__  treatments:   1. Patient will improve FOTO score to 53 points to indicate improved functional status goal met at 57/100 (40 /100 at IE )   2. Patient will be independent with HEP to self-manage and prevent symptoms upon DC.  - goal met - patient reports compliance with HEP (goal est and progressed from IE)   3.   Pt will have a decrease in ave pain of R ankle to < or = 5/10 to improve gait  Goal met at 4/10 per face scale (7-8/10 at IE)   4.   Pt will be able to ambulate the distance of 5 house in her neighborhood without an increase in pain or instability to return to her PLOF  Goal met - patient can amb 15 min (less than 5 house length at last assess)       PLAN  [x]  Upgrade activities as tolerated     []  Continue plan of care  []  Update interventions per flow sheet       []  Discharge due to:_  [x]  Other: WOLF NV, finalize HEP with pictures as needed       Carmen Aparicio, PT 9/25/2017 23-Mar-2025 20:45

## 2025-03-23 NOTE — ED PROVIDER NOTE - PROGRESS NOTE DETAILS
Pt is not a candidate for TNK because last seen normal was 23:00 pm last night daughter sts she did not see pt until she noted pt was not moving his left hand while walking in the house at 11:45 am. Discussed the ct head no bleed with the radiologist. Pt is not a candidate for TNK because last seen normal was 23:00 pm last night daughter sts she did not see pt until she noted pt was not moving his left hand while walking in the house at 11:45 am. Discussed the ct head no bleed with the radiologist. Pt is moving his left arm now. CTA neck/head no LVO. Pt is alert but confused not following verbal commends and trying get out of the stretcher. Ativan 1 mg iv is ordered for pt's safety.

## 2025-03-23 NOTE — H&P ADULT - TIME BILLING
coordination of care with ER physician and ER RN, obtaining history, performing a physical examination, reviewing and interpreting labs and imaging, ordering further studies and tests, explaining the diagnosis and treatment plan to wife over the phone, completing medication reconciliation, and documentation as above.

## 2025-03-23 NOTE — GOALS OF CARE CONVERSATION - ADVANCED CARE PLANNING - TREATMENT GUIDELINES
HUB TO READ    LEFT MESSAGE    Please inform patient that cholesterol is somewhat above goal, do recommend low-cholesterol diet and increase physical activity.  Somewhat more concerning liver enzymes are elevated.  ALT about 5 times normal and AST more than 2 times normal.  I do recommend repeating these labs in about a month after following a reduced fat/low cholesterol diet and if not improving may need to refer to GI.  Hepatitis C antibody was negative  
Spoke with patient he states the hub read him his results but did not document it. He had no questions or concerns.   
DNR/DNI

## 2025-03-23 NOTE — ED PROVIDER NOTE - CHIEF COMPLAINT
Pharmacy Vancomycin Note  Date of Service 2019  Patient's  1943   75 year old, female    Indication: Sepsis  Goal Trough Level: 15-20 mg/L  Day of Therapy: 3  Current Vancomycin regimen:  Intermittent dosing based uponmeasured drug level.  Most recent dose was 1000 mg IV given  at 21:00  Current estimated CrCl = Estimated Creatinine Clearance: 29.7 mL/min (A) (based on SCr of 1.71 mg/dL (H)).    Creatinine for last 3 days  2019: 10:55 PM Creatinine 0.83 mg/dL  2019:  5:00 AM Creatinine 1.04 mg/dL  2019:  4:35 AM Creatinine 1.72 mg/dL; 12:47 PM Creatinine 1.71 mg/dL  2019:  4:30 AM Creatinine 1.99 mg/dL;  2:22 PM Creatinine 1.71 mg/dL    Recent Vancomycin Levels (past 3 days)  2019:  8:45 PM Vancomycin Level 12.1 mg/L  2019: 10:00 PM Vancomycin Level 14.3 mg/L    Vancomycin IV Administrations (past 72 hours)                   vancomycin (VANCOCIN) 1000 mg in dextrose 5% 200 mL PREMIX (mg) 1,000 mg New Bag 19 221                Nephrotoxins and other renal medications (From now, onward)    Start     Dose/Rate Route Frequency Ordered Stop    19 204  vancomycin place frausto - receiving intermittent dosing      1 each Does not apply SEE ADMIN INSTRUCTIONS 19 230  phenylephrine (PHOEBE-SYNEPHRINE) 50 mg in sodium chloride 0.9 % 250 mL infusion      0.5-2 mcg/kg/min × 77.1 kg  11.6-46.3 mL/hr  Intravenous CONTINUOUS 19 2253               Contrast Orders - past 72 hours (72h ago, onward)    Start     Dose/Rate Route Frequency Ordered Stop    19 1515  perflutren diluted 1mL to 2mL with saline (OPTISON) diluted injection 9 mL     Comments:  NDC # 5887-4127-28    9 mL Intravenous ONCE 19 1508 19 1508    19 1100  technetium mertiatide Tc99m (MAG3) radioisotope injection 8 millicurie      8 millicurie Intravenous ONCE 19 1045 19 1046          Interpretation of levels and current regimen:  25 hour  post-dose level is appropriate for redosing      Has serum creatinine changed > 50% in last 72 hours: No    Urine output:  unable to determine    Renal Function: Improving    Plan:  1.  Redose Vancomycin 1000 mg IV x1  2.  Pharmacy will check trough levels as appropriate in with in 24 hours .    3. Serum creatinine levels will be ordered daily for the first week of therapy and at least twice weekly for subsequent weeks.      Benjamin Park PharmD        .       The patient is a 79y Male complaining of code: stroke.

## 2025-03-24 LAB
A1C WITH ESTIMATED AVERAGE GLUCOSE RESULT: 5.9 % — HIGH (ref 4–5.6)
CHOLEST SERPL-MCNC: 268 MG/DL — HIGH
ESTIMATED AVERAGE GLUCOSE: 123 MG/DL — HIGH (ref 68–114)
HDLC SERPL-MCNC: 81 MG/DL — SIGNIFICANT CHANGE UP
LDLC SERPL-MCNC: 177 MG/DL — HIGH
LIPID PNL WITH DIRECT LDL SERPL: 177 MG/DL — HIGH
NONHDLC SERPL-MCNC: 187 MG/DL — HIGH
TRIGL SERPL-MCNC: 65 MG/DL — SIGNIFICANT CHANGE UP
TSH SERPL-MCNC: 1.24 UIU/ML — SIGNIFICANT CHANGE UP (ref 0.36–3.74)

## 2025-03-24 PROCEDURE — 93010 ELECTROCARDIOGRAM REPORT: CPT

## 2025-03-24 PROCEDURE — 99232 SBSQ HOSP IP/OBS MODERATE 35: CPT

## 2025-03-24 RX ORDER — LORAZEPAM 4 MG/ML
1 VIAL (ML) INJECTION EVERY 6 HOURS
Refills: 0 | Status: DISCONTINUED | OUTPATIENT
Start: 2025-03-24 | End: 2025-03-24

## 2025-03-24 RX ORDER — OLANZAPINE 10 MG/1
2.5 TABLET ORAL ONCE
Refills: 0 | Status: COMPLETED | OUTPATIENT
Start: 2025-03-24 | End: 2025-03-24

## 2025-03-24 RX ORDER — OLANZAPINE 10 MG/1
2.5 TABLET ORAL ONCE
Refills: 0 | Status: DISCONTINUED | OUTPATIENT
Start: 2025-03-24 | End: 2025-03-24

## 2025-03-24 RX ORDER — OLANZAPINE 10 MG/1
5 TABLET ORAL EVERY 6 HOURS
Refills: 0 | Status: DISCONTINUED | OUTPATIENT
Start: 2025-03-24 | End: 2025-03-25

## 2025-03-24 RX ORDER — OLANZAPINE 10 MG/1
5 TABLET ORAL ONCE
Refills: 0 | Status: COMPLETED | OUTPATIENT
Start: 2025-03-24 | End: 2025-03-24

## 2025-03-24 RX ORDER — ENOXAPARIN SODIUM 100 MG/ML
40 INJECTION SUBCUTANEOUS EVERY 24 HOURS
Refills: 0 | Status: DISCONTINUED | OUTPATIENT
Start: 2025-03-24 | End: 2025-03-30

## 2025-03-24 RX ORDER — ASPIRIN 325 MG
81 TABLET ORAL DAILY
Refills: 0 | Status: DISCONTINUED | OUTPATIENT
Start: 2025-03-24 | End: 2025-03-30

## 2025-03-24 RX ORDER — LORAZEPAM 4 MG/ML
1 VIAL (ML) INJECTION ONCE
Refills: 0 | Status: DISCONTINUED | OUTPATIENT
Start: 2025-03-24 | End: 2025-03-24

## 2025-03-24 RX ADMIN — Medication 1 MILLIGRAM(S): at 16:27

## 2025-03-24 RX ADMIN — OLANZAPINE 5 MILLIGRAM(S): 10 TABLET ORAL at 11:27

## 2025-03-24 RX ADMIN — OLANZAPINE 2.5 MILLIGRAM(S): 10 TABLET ORAL at 10:57

## 2025-03-24 NOTE — PHYSICAL THERAPY INITIAL EVALUATION ADULT - BALANCE TRAINING, PT EVAL
Pt will increase static/dynamic sitting balance to good to perform all functional mobility without LOB, by 6 weeks.

## 2025-03-24 NOTE — OCCUPATIONAL THERAPY INITIAL EVALUATION ADULT - GENERAL OBSERVATIONS, REHAB EVAL
Pt was encountered sidelying on R side in bed; NAD, portable telemetry +, lethargic (OT performed deep sternal rub, but pt arousal level did not increase), unable to follow commands or directions; No pain observed. PT Rachid present.

## 2025-03-24 NOTE — PROGRESS NOTE ADULT - ASSESSMENT
Jordy Hester is an 79 year old male with PMHx of HLD and Alzheimer's dementia who presented to the ED on 3/23/25 for complaints of inability to move left hand and admitted for TIA vs. CVA.    TIA vs. CVA  Wife last saw patient at baseline self around 11:30PM last night  Wife states patient was speaking in 1-2 words when he usually speaks in complete sentences and she noticed he was not moving his left hand around 11AM  NIHSS 11 as per ER physician documentation, not TNK candidate given outside the window for thrombolytic therapy  CT head without acute intracranial findings  CTA brain without vascular stenosis, occlusion, aneurysm or vascular malformation  CTA neck without vessel narrowing or occlusion in the neck  CT brain perfusion with mismatch perfusion defect in the L. cerebral hemisphere suggestive of ischemia, mismatch volume: 42 mL, and matched perfusion defect in the R. cerebellar hemisphere  Neuro checks q4, NPO until SLP eval given failed bedside dysphagia eval as per ER RN   mg suppository x 1 ordered, VTE ppx with SCDs for now  Allowing for permissive HTN up to 220/120 for first 24-48 hours  F/u TSH, lipid panel, A1c for risk stratification  F/u echocardiogram with bubble study, MRI brain  PT/OT consulted  Telemetry to monitor for arrhythmias  Pending official neurology consult - please f/u in AM      Chronic medical conditions:  HLD: PTA simvastatin 20 mg qhs reordered as atorvastatin 10 mg qhs given simvastatin not on formulary  Alzheimer's dementia: PTA donepezil 10 mg qhs, memantine 10 mg BID, frequent turning and repositioning q2    Medication reconciliation completed using med list provided by wife over the phone.    Plan of care discussed with wife, Kacy over the phone. Wife is requesting for day team to contact daughter, Dr. Tanya Hester (anesthesiologist, [phone]) for clinical updates "since she understands medical conditions." Jordy Hester is an 79 year old male with PMHx of HLD and Alzheimer's dementia who presented to the ED on 3/23/25 for complaints of inability to move left hand and admitted for TIA vs. CVA.    TIA vs. CVA  Wife last saw patient at baseline self around 11:30PM night prior to admission  Wife states patient was speaking in 1-2 words when he usually speaks in complete sentences and she noticed he was not moving his left hand around 11AM  NIHSS 11 as per ER physician documentation, not TNK candidate given outside the window for thrombolytic therapy  CT head without acute intracranial findings  CTA brain without vascular stenosis, occlusion, aneurysm or vascular malformation  CTA neck without vessel narrowing or occlusion in the neck  CT brain perfusion with mismatch perfusion defect in the L. cerebral hemisphere suggestive of ischemia, mismatch volume: 42 mL, and matched perfusion defect in the R. cerebellar hemisphere  Neuro checks q4, NPO until SLP eval given failed bedside dysphagia eval as per ER RN  Allowing for permissive HTN up to 220/120 for first 24-48 hours  F/u TSH, lipid panel, A1c for risk stratification  F/u echocardiogram with bubble study, MRI brain  PT/OT consulted  Telemetry to monitor for arrhythmias  Neurology consulted, recommended MRI brain but if patient is too agitated can get CTH 3/25, ASA, statin, DVT ppx    Alzheimer's dementia: PTA donepezil 10 mg qhs, memantine 10 mg BID, frequent turning and repositioning q2  S/p IV zyprexa 3/24 due to agitation   Can start seroquel qhs if patient becomes agitated again     Prediabetes   A1c 5.9 on admission   POC glucose       Chronic medical conditions:  HLD: PTA simvastatin 20 mg qhs reordered as atorvastatin 10 mg qhs given simvastatin not on formulary      Medication reconciliation completed using med list provided by wife over the phone.    Called daughter Dr. Tanya Hester (anesthesiologist, [phone]) for clinical updates, no answer. Left VM to call back when she can.

## 2025-03-24 NOTE — PHYSICAL THERAPY INITIAL EVALUATION ADULT - AMBULATION SKILLS, REHAB EVAL
Problem: Pain  Goal: Verbalizes/displays adequate comfort level or baseline comfort level  Outcome: Progressing  Flowsheets (Taken 6/21/2023 1955)  Verbalizes/displays adequate comfort level or baseline comfort level: Encourage patient to monitor pain and request assistance     Problem: Safety - Adult  Goal: Free from fall injury  Outcome: Progressing     Problem: ABCDS Injury Assessment  Goal: Absence of physical injury  Outcome: Progressing independent

## 2025-03-24 NOTE — CONSULT NOTE ADULT - ASSESSMENT
79 year old male with PMHx of HLD and Alzheimer's dementia who presents with worsening confusion, per wife moving left hand less. Exam lethargic opens eyes no verbal output, withdraws NIHSS 17  CTH - CTA no LVO  CTP    Impression: AMS, r/o stoke    MRI brain -  If MRI unrevealing, would obtain an EEG  Aspirin for secondary stroke prevention at this time.   Atorvastatin , titrate the dose according to LDL.   TTE and tele  DVT prophylaxis, Neurochecks  Permissive HTN up to 220/120 mmHg for first 24 hours after the symptom onset followed by gradual normotension.   HbA1C and LDL.   PT/OT/Speech and swallow/safety eval.    79 year old male with PMHx of HLD and Alzheimer's dementia who presents with worsening confusion, per wife moving left hand less. Exam lethargic opens eyes no verbal output, withdraws NIHSS 17  CTH - CTA no LVO  CTP    Impression: AMS, r/o stoke    MRI brain   If MRI unrevealing, would obtain an EEG  Aspirin for secondary stroke prevention at this time.   Atorvastatin , titrate the dose according to LDL.   TTE and tele  DVT prophylaxis, Neurochecks  Permissive HTN up to 220/120 mmHg for first 24 hours after the symptom onset followed by gradual normotension.   HbA1C and LDL.   PT/OT/Speech and swallow/safety eval.

## 2025-03-24 NOTE — PHYSICAL THERAPY INITIAL EVALUATION ADULT - PERTINENT HX OF CURRENT PROBLEM, REHAB EVAL
Jordy Hester is an 79 year old male with PMHx of HLD and Alzheimer's dementia who presented to the ED on 3/23/25 for complaints of inability to move left hand and admitted for TIA vs. CVA.  CT head without acute intracranial findings  CTA brain without vascular stenosis, occlusion, aneurysm or vascular malformation  CTA neck without vessel narrowing or occlusion in the neck  CT brain perfusion with mismatch perfusion defect in the L. cerebral hemisphere suggestive of ischemia, mismatch volume: 42 mL, and matched perfusion defect in the R. cerebellar hemisphere

## 2025-03-24 NOTE — PHYSICAL THERAPY INITIAL EVALUATION ADULT - IMPAIRMENTS FOUND, PT EVAL
aerobic capacity/endurance/cognitive impairment/gait, locomotion, and balance/muscle strength/poor safety awareness/sensory integrity

## 2025-03-24 NOTE — PHYSICAL THERAPY INITIAL EVALUATION ADULT - GENERAL OBSERVATIONS, REHAB EVAL
Pt was seen in R lateral sidelying c cardiac monitor +, L facial weakness +, lethargic but able to be aroused by kinetic stimulation. OT, Vincenzo present for SPH. Pt was unable to follow any command.

## 2025-03-24 NOTE — PROGRESS NOTE ADULT - ASSESSMENT
Jordy Hester is an 79 year old male with PMHx of HLD and Alzheimer's dementia who presented to the ED on 3/23/25 for complaints of inability to move left hand and admitted for TIA vs. CVA.    TIA vs. CVA  Wife last saw patient at baseline self around 11:30PM last night  Wife states patient was speaking in 1-2 words when he usually speaks in complete sentences and she noticed he was not moving his left hand around 11AM  NIHSS 11 as per ER physician documentation, not TNK candidate given outside the window for thrombolytic therapy  CT head without acute intracranial findings  CTA brain without vascular stenosis, occlusion, aneurysm or vascular malformation  CTA neck without vessel narrowing or occlusion in the neck  CT brain perfusion with mismatch perfusion defect in the L. cerebral hemisphere suggestive of ischemia, mismatch volume: 42 mL, and matched perfusion defect in the R. cerebellar hemisphere  Neuro checks q4, NPO until SLP eval given failed bedside dysphagia eval as per ER RN   mg suppository x 1 ordered, VTE ppx with SCDs for now  Allowing for permissive HTN up to 220/120 for first 24-48 hours  F/u TSH, lipid panel, A1c for risk stratification  F/u echocardiogram with bubble study, MRI brain  PT/OT consulted  Telemetry to monitor for arrhythmias  Pending official neurology consult - please f/u in AM      Chronic medical conditions:  HLD: PTA simvastatin 20 mg qhs reordered as atorvastatin 10 mg qhs given simvastatin not on formulary  Alzheimer's dementia: PTA donepezil 10 mg qhs, memantine 10 mg BID, frequent turning and repositioning q2    Medication reconciliation completed using med list provided by wife over the phone.    Plan of care discussed with wife, Kacy over the phone. Wife is requesting for day team to contact daughter, Dr. Tanya Hester (anesthesiologist, [phone]) for clinical updates "since she understands medical conditions."

## 2025-03-24 NOTE — OCCUPATIONAL THERAPY INITIAL EVALUATION ADULT - RLE MMT, REHAB EVAL
Unable to accurately assess due to confusion; Grossly equal to or greater then 3/5 strength throughout.

## 2025-03-24 NOTE — OCCUPATIONAL THERAPY INITIAL EVALUATION ADULT - ADDITIONAL COMMENTS
Pt is a poor historian. As per EMR, pt lives with family in a private house 5 steps to enter + rails. Once inside, the pt has a flight of steps to reach main floor where the bedroom and bathroom is. The pt ambulates with no device.

## 2025-03-24 NOTE — OCCUPATIONAL THERAPY INITIAL EVALUATION ADULT - PERTINENT HX OF CURRENT PROBLEM, REHAB EVAL
As per H&P; Jordy Hester is an 79 year old male with PMHx of HLD and Alzheimer's dementia who presented to the ED on 3/23/25 for complaints of inability to move left hand.    History primarily obtained from wife, Kacy Gonzalez ([phone]) as patient is unable to provide history due to dementia. As per wife, they stayed up late to watch a movie last night and patient fell asleep around 11:30PM. Wife sleeps in a different room than him but noticed patient was not up yet past 10AM this morning which is atypical for him. She did not think much of it and just let him sleep. Then when patient woke up around 11AM, wife noticed he was wearing different clothes than when he went to sleep. Upon wife questioning him when he changed his clothes, patient was not responding appropriately. Wife states he was speaking in 1-2 words when he usually speaks in complete sentences. Then she noticed his left hand was not moving. Brought to the ER for further evaluation. Baseline functional status is ambulates unassisted and independent with most ADLs. Requires assistance with showering. Lives at home with wife, daughter, and son-in-law.    In the ED, VSS except BP as elevated as 175/95. Labs grossly unremarkable. U/A unremarkable. CT head without acute intracranial findings. CTA brain without vascular stenosis, occlusion, aneurysm or vascular malformation. CTA neck without vessel narrowing or occlusion in the neck. CT brain perfusion with mismatch perfusion defect in the left cerebral hemisphere suggestive of ischemia. Mismatch volume: 42 mL. Matched perfusion defect in the right cerebellar hemisphere. Received lorazepam 1 mg IV. ER physician contacted neurology who will see patient in AM.

## 2025-03-24 NOTE — OCCUPATIONAL THERAPY INITIAL EVALUATION ADULT - RANGE OF MOTION EXAMINATION, LOWER EXTREMITY
Unable to accurately assess due to cognition; BLE AROM WFL throughout./bilateral LE Passive ROM was WFL  (within functional limits)

## 2025-03-24 NOTE — OCCUPATIONAL THERAPY INITIAL EVALUATION ADULT - RANGE OF MOTION EXAMINATION, UPPER EXTREMITY
Unable to accurately assess due to cognition; BUE AROM WFL throughout./bilateral UE Passive ROM was WFL  (within functional limits)

## 2025-03-24 NOTE — OCCUPATIONAL THERAPY INITIAL EVALUATION ADULT - TRANSFER TRAINING, PT EVAL
Patient will be able to perform functional transfers, using least restrictive device, independently within 8 weeks.

## 2025-03-24 NOTE — PHYSICAL THERAPY INITIAL EVALUATION ADULT - ADDITIONAL COMMENTS
As per EMR : Prior to admission, pt was able to ambulate independently w/o assist and pt was independent in ADL except shower.

## 2025-03-24 NOTE — PROGRESS NOTE ADULT - SUBJECTIVE AND OBJECTIVE BOX
Patient is a 79y old  Male who presents with a chief complaint of TIA vs. CVA (24 Mar 2025 13:14)      INTERVAL HPI/OVERNIGHT EVENTS:  -code gray this morning due to agitation, improved with IV zyprexa   -seen and examined at bedside     MEDICATIONS  (STANDING):  atorvastatin 10 milliGRAM(s) Oral at bedtime  donepezil 10 milliGRAM(s) Oral at bedtime  memantine 10 milliGRAM(s) Oral two times a day    MEDICATIONS  (PRN):  acetaminophen     Tablet .. 650 milliGRAM(s) Oral every 6 hours PRN Temp greater or equal to 38C (100.4F), Mild Pain (1 - 3)  aluminum hydroxide/magnesium hydroxide/simethicone Suspension 30 milliLiter(s) Oral every 4 hours PRN Dyspepsia  melatonin 3 milliGRAM(s) Oral at bedtime PRN Insomnia  ondansetron Injectable 4 milliGRAM(s) IV Push every 8 hours PRN Nausea and/or Vomiting      Allergies    ACE inhibitors (Angioedema)    Intolerances        REVIEW OF SYSTEMS:  unable to assess due to mental status     Vital Signs Last 24 Hrs  T(C): 36.6 (24 Mar 2025 10:11), Max: 37.2 (23 Mar 2025 16:54)  T(F): 97.8 (24 Mar 2025 10:11), Max: 98.9 (23 Mar 2025 16:54)  HR: 92 (24 Mar 2025 10:11) (73 - 119)  BP: 124/76 (24 Mar 2025 10:11) (121/80 - 162/78)  BP(mean): 111 (23 Mar 2025 16:54) (111 - 111)  RR: 18 (24 Mar 2025 10:11) (15 - 20)  SpO2: 98% (24 Mar 2025 10:11) (95% - 100%)    Parameters below as of 23 Mar 2025 21:06  Patient On (Oxygen Delivery Method): room air        PHYSICAL EXAM:  GENERAL: NAD, well-groomed, well-developed  HEAD:  Atraumatic, Normocephalic  EYES: EOMI, sclera non-icteric  ENMT:  Moist mucous membranes  NERVOUS SYSTEM:  Alert & Oriented to name only   CHEST/LUNG: Clear to percussion bilaterally  HEART: Regular rate and rhythm  ABDOMEN: Soft, Nontender, Nondistended; Bowel sounds present  EXTREMITIES:  no LE edema   SKIN: warm, dry      LABS:                        14.1   5.01  )-----------( 255      ( 23 Mar 2025 15:13 )             43.2     03-23    138  |  103  |  11  ----------------------------<  101[H]  4.1   |  28  |  0.82    Ca    9.4      23 Mar 2025 15:13    TPro  7.8  /  Alb  3.8  /  TBili  1.2  /  DBili  x   /  AST  22  /  ALT  25  /  AlkPhos  86  03-23    PT/INR - ( 23 Mar 2025 15:13 )   PT: 11.6 sec;   INR: 1.00 ratio         PTT - ( 23 Mar 2025 15:13 )  PTT:35.2 sec  Urinalysis Basic - ( 23 Mar 2025 15:56 )    Color: Yellow / Appearance: Clear / SG: >1.030 / pH: x  Gluc: x / Ketone: Negative mg/dL  / Bili: Negative / Urobili: 0.2 mg/dL   Blood: x / Protein: Negative mg/dL / Nitrite: Negative   Leuk Esterase: Negative / RBC: x / WBC x   Sq Epi: x / Non Sq Epi: x / Bacteria: x      CAPILLARY BLOOD GLUCOSE          RADIOLOGY & ADDITIONAL TESTS:    Imaging Personally Reviewed:  [ X] YES  [ ] NO    Consultant(s) Notes Reviewed:  [ X] YES  [ ] NO    Care Discussed with Consultants/Other Providers [X ] YES  [ ] NO Patient is a 79y old  Male who presents with a chief complaint of TIA vs. CVA (24 Mar 2025 13:14)      INTERVAL HPI/OVERNIGHT EVENTS:  -code gray this morning due to agitation, improved with IV zyprexa   -seen and examined at bedside after receiving zyprexa, more calm and cooperative     MEDICATIONS  (STANDING):  atorvastatin 10 milliGRAM(s) Oral at bedtime  donepezil 10 milliGRAM(s) Oral at bedtime  memantine 10 milliGRAM(s) Oral two times a day    MEDICATIONS  (PRN):  acetaminophen     Tablet .. 650 milliGRAM(s) Oral every 6 hours PRN Temp greater or equal to 38C (100.4F), Mild Pain (1 - 3)  aluminum hydroxide/magnesium hydroxide/simethicone Suspension 30 milliLiter(s) Oral every 4 hours PRN Dyspepsia  melatonin 3 milliGRAM(s) Oral at bedtime PRN Insomnia  ondansetron Injectable 4 milliGRAM(s) IV Push every 8 hours PRN Nausea and/or Vomiting      Allergies    ACE inhibitors (Angioedema)    Intolerances        REVIEW OF SYSTEMS:  unable to assess due to mental status     Vital Signs Last 24 Hrs  T(C): 36.6 (24 Mar 2025 10:11), Max: 37.2 (23 Mar 2025 16:54)  T(F): 97.8 (24 Mar 2025 10:11), Max: 98.9 (23 Mar 2025 16:54)  HR: 92 (24 Mar 2025 10:11) (73 - 119)  BP: 124/76 (24 Mar 2025 10:11) (121/80 - 162/78)  BP(mean): 111 (23 Mar 2025 16:54) (111 - 111)  RR: 18 (24 Mar 2025 10:11) (15 - 20)  SpO2: 98% (24 Mar 2025 10:11) (95% - 100%)    Parameters below as of 23 Mar 2025 21:06  Patient On (Oxygen Delivery Method): room air        PHYSICAL EXAM:  GENERAL: NAD, well-groomed, well-developed  HEAD:  Atraumatic, Normocephalic  EYES: EOMI, sclera non-icteric  ENMT:  Moist mucous membranes  NERVOUS SYSTEM:  Alert & Oriented to name only   CHEST/LUNG: Clear to percussion bilaterally  HEART: Regular rate and rhythm  ABDOMEN: Soft, Nontender, Nondistended  EXTREMITIES:  no LE edema   SKIN: warm, dry      LABS:                        14.1   5.01  )-----------( 255      ( 23 Mar 2025 15:13 )             43.2     03-23    138  |  103  |  11  ----------------------------<  101[H]  4.1   |  28  |  0.82    Ca    9.4      23 Mar 2025 15:13    TPro  7.8  /  Alb  3.8  /  TBili  1.2  /  DBili  x   /  AST  22  /  ALT  25  /  AlkPhos  86  03-23    PT/INR - ( 23 Mar 2025 15:13 )   PT: 11.6 sec;   INR: 1.00 ratio         PTT - ( 23 Mar 2025 15:13 )  PTT:35.2 sec  Urinalysis Basic - ( 23 Mar 2025 15:56 )    Color: Yellow / Appearance: Clear / SG: >1.030 / pH: x  Gluc: x / Ketone: Negative mg/dL  / Bili: Negative / Urobili: 0.2 mg/dL   Blood: x / Protein: Negative mg/dL / Nitrite: Negative   Leuk Esterase: Negative / RBC: x / WBC x   Sq Epi: x / Non Sq Epi: x / Bacteria: x      CAPILLARY BLOOD GLUCOSE          RADIOLOGY & ADDITIONAL TESTS:    Imaging Personally Reviewed:  [ X] YES  [ ] NO    Consultant(s) Notes Reviewed:  [ X] YES  [ ] NO    Care Discussed with Consultants/Other Providers [X ] YES  [ ] NO

## 2025-03-24 NOTE — CONSULT NOTE ADULT - SUBJECTIVE AND OBJECTIVE BOX
Neurology consult    JORDY HESTER79yMale     Patient is a 79y old  Male who presents with a chief complaint of TIA vs. CVA (24 Mar 2025 09:42)      HPI:  Jordy Hester is an 79 year old male with PMHx of HLD and Alzheimer's dementia who presented to the ED on 3/23/25 for complaints of inability to move left hand.    History primarily obtained from wife, Kacy Gonzalez ([phone]) as patient is unable to provide history due to dementia. As per wife, they stayed up late to watch a movie last night and patient fell asleep around 11:30PM. Wife sleeps in a different room than him but noticed patient was not up yet past 10AM this morning which is atypical for him. She did not think much of it and just let him sleep. Then when patient woke up around 11AM, wife noticed he was wearing different clothes than when he went to sleep. Upon wife questioning him when he changed his clothes, patient was not responding appropriately. Wife states he was speaking in 1-2 words when he usually speaks in complete sentences. Then she noticed his left hand was not moving. Brought to the ER for further evaluation. Baseline functional status is ambulates unassisted and independent with most ADLs. Requires assistance with showering. Lives at home with wife, daughter, and son-in-law.    In the ED, VSS except BP as elevated as 175/95. Labs grossly unremarkable. U/A unremarkable. CT head without acute intracranial findings. CTA brain without vascular stenosis, occlusion, aneurysm or vascular malformation. CTA neck without vessel narrowing or occlusion in the neck. CT brain perfusion with mismatch perfusion defect in the left cerebral hemisphere suggestive of ischemia. Mismatch volume: 42 mL. Matched perfusion defect in the right cerebellar hemisphere. Received lorazepam 1 mg IV. ER physician contacted neurology who will see patient in AM. (23 Mar 2025 20:06)      no difficulty with language.  No vision loss or double vision.  No dizziness, vertigo or new hearing loss.  . No difficulty with swallowing.  No focal weakness.  No focal sensory changes.  No numbness or tingling in the bilateral lower extremities.  No difficulty with balance.  No difficulty with ambulation.    REVIEW OF SYSTEMS:    Constitutional: No fever, chills, fatigue, weakness  Eyes: no eye pain, visual disturbances, or discharge  ENT:  No difficulty hearing, tinnitus, vertigo; No sinus or throat pain  Neck: No pain or stiffness  Respiratory: No cough, dyspnea, wheezing   Cardiovascular: No chest pain, palpitations,   Gastrointestinal: No abdominal or epigastric pain. No nausea, vomiting  No diarrhea or constipation.   Genitourinary: No dysuria, frequency, hematuria or incontinence  Neurological: No headaches, lightheadedness, vertigo, numbness or tremors  Psychiatric: No depression, anxiety, mood swings or difficulty sleeping  Musculoskeletal: No joint pain or swelling; No muscle, back or extremity pain  Skin: No itching, burning, rashes or lesions   Lymph Nodes: No enlarged glands  Endocrine: No heat or cold intolerance; No hair loss, No h/o diabetes or thyroid dysfunction  Allergy and Immunologic: No hives or eczema    MEDICATIONS    acetaminophen     Tablet .. 650 milliGRAM(s) Oral every 6 hours PRN  aluminum hydroxide/magnesium hydroxide/simethicone Suspension 30 milliLiter(s) Oral every 4 hours PRN  atorvastatin 10 milliGRAM(s) Oral at bedtime  donepezil 10 milliGRAM(s) Oral at bedtime  melatonin 3 milliGRAM(s) Oral at bedtime PRN  memantine 10 milliGRAM(s) Oral two times a day  ondansetron Injectable 4 milliGRAM(s) IV Push every 8 hours PRN      PMH: Hypertension    Arthritis    Dementia    HTN (hypertension)    Hypercholesteremia    HLD (hyperlipidemia)    Alzheimer's dementia         PSH: H/O colonoscopy    H/O cystoscopy    Abdominal wall hernia        Family history: No history of dementia, strokes, or seizures   FAMILY HISTORY:  Hypertension (Mother)        SOCIAL HISTORY:  No history of tobacco or alcohol use     Allergies    ACE inhibitors (Angioedema)    Intolerances          Weight (kg): 66.4 (03-23 @ 21:06)    Vital Signs Last 24 Hrs  T(C): 36.6 (24 Mar 2025 10:11), Max: 37.2 (23 Mar 2025 16:54)  T(F): 97.8 (24 Mar 2025 10:11), Max: 98.9 (23 Mar 2025 16:54)  HR: 92 (24 Mar 2025 10:11) (62 - 119)  BP: 124/76 (24 Mar 2025 10:11) (121/80 - 175/78)  BP(mean): 111 (23 Mar 2025 16:54) (111 - 111)  RR: 18 (24 Mar 2025 10:11) (15 - 20)  SpO2: 98% (24 Mar 2025 10:11) (95% - 100%)    Parameters below as of 23 Mar 2025 21:06  Patient On (Oxygen Delivery Method): room air          On Neurological Examination:    Neuro: eyes closed opens to stimuli  CN: PERRL, EOMI, VFF normal gaze preference, no facial palsy,     Motor: withdraws    Sensory: Intact to LT and PP no extinction    Coordination: FTN intact b/l            GENERAL Exam:     Nontoxic , No Acute Distress   	  HEENT:  normocephalic, atraumatic  		  LUNGS:	Clear bilaterally  No Wheeze    	  HEART:	 Normal S1S2   No murmur RRR        	  GI/ ABDOMEN:  Soft  Non tender    EXTREMITIES:   No Edema  No Clubbing  No Cyanosis No Edema    MUSCULOSKELETAL: Normal Range of Motion  	   SKIN:      Normal   No Ecchymosis               LABS:  CBC Full  -  ( 23 Mar 2025 15:13 )  WBC Count : 5.01 K/uL  RBC Count : 4.89 M/uL  Hemoglobin : 14.1 g/dL  Hematocrit : 43.2 %  Platelet Count - Automated : 255 K/uL  Mean Cell Volume : 88.3 fl  Mean Cell Hemoglobin : 28.8 pg  Mean Cell Hemoglobin Concentration : 32.6 g/dL  Auto Neutrophil # : x  Auto Lymphocyte # : x  Auto Monocyte # : x  Auto Eosinophil # : x  Auto Basophil # : x  Auto Neutrophil % : x  Auto Lymphocyte % : x  Auto Monocyte % : x  Auto Eosinophil % : x  Auto Basophil % : x    Urinalysis Basic - ( 23 Mar 2025 15:56 )    Color: Yellow / Appearance: Clear / SG: >1.030 / pH: x  Gluc: x / Ketone: Negative mg/dL  / Bili: Negative / Urobili: 0.2 mg/dL   Blood: x / Protein: Negative mg/dL / Nitrite: Negative   Leuk Esterase: Negative / RBC: x / WBC x   Sq Epi: x / Non Sq Epi: x / Bacteria: x      03-23    138  |  103  |  11  ----------------------------<  101[H]  4.1   |  28  |  0.82    Ca    9.4      23 Mar 2025 15:13    TPro  7.8  /  Alb  3.8  /  TBili  1.2  /  DBili  x   /  AST  22  /  ALT  25  /  AlkPhos  86  03-23    LIVER FUNCTIONS - ( 23 Mar 2025 15:13 )  Alb: 3.8 g/dL / Pro: 7.8 gm/dL / ALK PHOS: 86 U/L / ALT: 25 U/L / AST: 22 U/L / GGT: x           Hemoglobin A1C:       PT/INR - ( 23 Mar 2025 15:13 )   PT: 11.6 sec;   INR: 1.00 ratio         PTT - ( 23 Mar 2025 15:13 )  PTT:35.2 sec      RADIOLOGY  < from: CT Brain Stroke Protocol (03.23.25 @ 15:08) >  CTA HEAD:    Evaluation of the anterior circulation demonstrates scattered   atherosclerotic calcifications along the distal internal carotid arteries.    The proximal anterior, middle and posterior cerebral arteries are patent   bilaterally.    Evaluation of the posterior circulation demonstrates patent   vertebrobasilar system.    No evidence of vascular stenosis, occlusion, aneurysm or vascular  malformation in the [place].        IMPRESSION:    CT HEAD: No acute intracranial findings.    Findings discussed with Dr. Werner of the patient's clinical team on   3/23/2025 at 3:12 PM.    CT PERFUSION: Mismatch perfusion defect in the left cerebral hemisphere   suggestive of ischemia. Mismatch volume: 42 mL.    < end of copied text >

## 2025-03-24 NOTE — PROGRESS NOTE ADULT - SUBJECTIVE AND OBJECTIVE BOX
Patient is a 79y old  Male who presents with a chief complaint of TIA vs. CVA (23 Mar 2025 20:06)      INTERVAL HPI/OVERNIGHT EVENTS:  -nothing acute overnight   -seen and examined at bedside     MEDICATIONS  (STANDING):  atorvastatin 10 milliGRAM(s) Oral at bedtime  donepezil 10 milliGRAM(s) Oral at bedtime  memantine 10 milliGRAM(s) Oral two times a day    MEDICATIONS  (PRN):  acetaminophen     Tablet .. 650 milliGRAM(s) Oral every 6 hours PRN Temp greater or equal to 38C (100.4F), Mild Pain (1 - 3)  aluminum hydroxide/magnesium hydroxide/simethicone Suspension 30 milliLiter(s) Oral every 4 hours PRN Dyspepsia  melatonin 3 milliGRAM(s) Oral at bedtime PRN Insomnia  ondansetron Injectable 4 milliGRAM(s) IV Push every 8 hours PRN Nausea and/or Vomiting      Allergies    ACE inhibitors (Angioedema)    Intolerances        REVIEW OF SYSTEMS:  unable to assess due to baseline mental status     Vital Signs Last 24 Hrs  T(C): 36.7 (24 Mar 2025 04:36), Max: 37.2 (23 Mar 2025 16:54)  T(F): 98 (24 Mar 2025 04:36), Max: 98.9 (23 Mar 2025 16:54)  HR: 90 (24 Mar 2025 04:36) (62 - 119)  BP: 125/75 (24 Mar 2025 04:36) (121/80 - 175/78)  BP(mean): 111 (23 Mar 2025 16:54) (111 - 111)  RR: 18 (24 Mar 2025 04:36) (15 - 20)  SpO2: 100% (24 Mar 2025 04:36) (95% - 100%)    Parameters below as of 23 Mar 2025 21:06  Patient On (Oxygen Delivery Method): room air        PHYSICAL EXAM:  GENERAL: NAD, well-groomed, well-developed  HEAD:  Atraumatic, Normocephalic  EYES: EOMI, PERRLA, conjunctiva and sclera clear  ENMT: No tonsillar erythema, exudates, or enlargement; Moist mucous membranes, Good dentition, No lesions  NECK: Supple, No JVD, Normal thyroid  NERVOUS SYSTEM:  Alert & Oriented X3, Good concentration; Motor Strength 5/5 B/L upper and lower extremities; DTRs 2+ intact and symmetric  CHEST/LUNG: Clear to percussion bilaterally; No rales, rhonchi, wheezing, or rubs  HEART: Regular rate and rhythm; No murmurs, rubs, or gallops  ABDOMEN: Soft, Nontender, Nondistended; Bowel sounds present  EXTREMITIES:  2+ Peripheral Pulses, No clubbing, cyanosis, or edema  LYMPH: No lymphadenopathy noted  SKIN: No rashes or lesions    LABS:                        14.1   5.01  )-----------( 255      ( 23 Mar 2025 15:13 )             43.2     03-23    138  |  103  |  11  ----------------------------<  101[H]  4.1   |  28  |  0.82    Ca    9.4      23 Mar 2025 15:13    TPro  7.8  /  Alb  3.8  /  TBili  1.2  /  DBili  x   /  AST  22  /  ALT  25  /  AlkPhos  86  03-23    PT/INR - ( 23 Mar 2025 15:13 )   PT: 11.6 sec;   INR: 1.00 ratio         PTT - ( 23 Mar 2025 15:13 )  PTT:35.2 sec  Urinalysis Basic - ( 23 Mar 2025 15:56 )    Color: Yellow / Appearance: Clear / SG: >1.030 / pH: x  Gluc: x / Ketone: Negative mg/dL  / Bili: Negative / Urobili: 0.2 mg/dL   Blood: x / Protein: Negative mg/dL / Nitrite: Negative   Leuk Esterase: Negative / RBC: x / WBC x   Sq Epi: x / Non Sq Epi: x / Bacteria: x      CAPILLARY BLOOD GLUCOSE      POCT Blood Glucose.: 75 mg/dL (23 Mar 2025 14:49)      RADIOLOGY & ADDITIONAL TESTS:    Imaging Personally Reviewed:  [ X] YES  [ ] NO    Consultant(s) Notes Reviewed:  [ X] YES  [ ] NO    Care Discussed with Consultants/Other Providers [X ] YES  [ ] NO

## 2025-03-25 DIAGNOSIS — R41.0 DISORIENTATION, UNSPECIFIED: ICD-10-CM

## 2025-03-25 LAB
ANION GAP SERPL CALC-SCNC: 9 MMOL/L — SIGNIFICANT CHANGE UP (ref 5–17)
BUN SERPL-MCNC: 21 MG/DL — SIGNIFICANT CHANGE UP (ref 7–23)
CALCIUM SERPL-MCNC: 9.2 MG/DL — SIGNIFICANT CHANGE UP (ref 8.5–10.1)
CHLORIDE SERPL-SCNC: 107 MMOL/L — SIGNIFICANT CHANGE UP (ref 96–108)
CO2 SERPL-SCNC: 25 MMOL/L — SIGNIFICANT CHANGE UP (ref 22–31)
CREAT SERPL-MCNC: 0.97 MG/DL — SIGNIFICANT CHANGE UP (ref 0.5–1.3)
EGFR: 79 ML/MIN/1.73M2 — SIGNIFICANT CHANGE UP
EGFR: 79 ML/MIN/1.73M2 — SIGNIFICANT CHANGE UP
GLUCOSE SERPL-MCNC: 80 MG/DL — SIGNIFICANT CHANGE UP (ref 70–99)
HCT VFR BLD CALC: 39.9 % — SIGNIFICANT CHANGE UP (ref 39–50)
HGB BLD-MCNC: 13 G/DL — SIGNIFICANT CHANGE UP (ref 13–17)
MAGNESIUM SERPL-MCNC: 2.3 MG/DL — SIGNIFICANT CHANGE UP (ref 1.6–2.6)
MCHC RBC-ENTMCNC: 28.8 PG — SIGNIFICANT CHANGE UP (ref 27–34)
MCHC RBC-ENTMCNC: 32.6 G/DL — SIGNIFICANT CHANGE UP (ref 32–36)
MCV RBC AUTO: 88.5 FL — SIGNIFICANT CHANGE UP (ref 80–100)
NRBC BLD AUTO-RTO: 0 /100 WBCS — SIGNIFICANT CHANGE UP (ref 0–0)
PHOSPHATE SERPL-MCNC: 3.9 MG/DL — SIGNIFICANT CHANGE UP (ref 2.5–4.5)
PLATELET # BLD AUTO: 248 K/UL — SIGNIFICANT CHANGE UP (ref 150–400)
POTASSIUM SERPL-MCNC: 3.9 MMOL/L — SIGNIFICANT CHANGE UP (ref 3.5–5.3)
POTASSIUM SERPL-SCNC: 3.9 MMOL/L — SIGNIFICANT CHANGE UP (ref 3.5–5.3)
RBC # BLD: 4.51 M/UL — SIGNIFICANT CHANGE UP (ref 4.2–5.8)
RBC # FLD: 15.2 % — HIGH (ref 10.3–14.5)
SODIUM SERPL-SCNC: 141 MMOL/L — SIGNIFICANT CHANGE UP (ref 135–145)
WBC # BLD: 5.83 K/UL — SIGNIFICANT CHANGE UP (ref 3.8–10.5)
WBC # FLD AUTO: 5.83 K/UL — SIGNIFICANT CHANGE UP (ref 3.8–10.5)

## 2025-03-25 PROCEDURE — 99222 1ST HOSP IP/OBS MODERATE 55: CPT

## 2025-03-25 PROCEDURE — 99232 SBSQ HOSP IP/OBS MODERATE 35: CPT

## 2025-03-25 PROCEDURE — 70450 CT HEAD/BRAIN W/O DYE: CPT | Mod: 26

## 2025-03-25 RX ORDER — LORAZEPAM 4 MG/ML
1 VIAL (ML) INJECTION ONCE
Refills: 0 | Status: DISCONTINUED | OUTPATIENT
Start: 2025-03-25 | End: 2025-03-25

## 2025-03-25 RX ORDER — SODIUM CHLORIDE 9 G/1000ML
1000 INJECTION, SOLUTION INTRAVENOUS
Refills: 0 | Status: DISCONTINUED | OUTPATIENT
Start: 2025-03-25 | End: 2025-03-28

## 2025-03-25 RX ORDER — ATORVASTATIN CALCIUM 80 MG/1
40 TABLET, FILM COATED ORAL AT BEDTIME
Refills: 0 | Status: DISCONTINUED | OUTPATIENT
Start: 2025-03-25 | End: 2025-03-30

## 2025-03-25 RX ORDER — HALOPERIDOL 10 MG/1
3 TABLET ORAL ONCE
Refills: 0 | Status: COMPLETED | OUTPATIENT
Start: 2025-03-25 | End: 2025-03-25

## 2025-03-25 RX ORDER — LORAZEPAM 4 MG/ML
2 VIAL (ML) INJECTION ONCE
Refills: 0 | Status: DISCONTINUED | OUTPATIENT
Start: 2025-03-25 | End: 2025-03-25

## 2025-03-25 RX ORDER — RISPERIDONE 4 MG
1 TABLET ORAL
Refills: 0 | Status: DISCONTINUED | OUTPATIENT
Start: 2025-03-25 | End: 2025-03-30

## 2025-03-25 RX ADMIN — ENOXAPARIN SODIUM 40 MILLIGRAM(S): 100 INJECTION SUBCUTANEOUS at 05:07

## 2025-03-25 RX ADMIN — SODIUM CHLORIDE 75 MILLILITER(S): 9 INJECTION, SOLUTION INTRAVENOUS at 16:55

## 2025-03-25 RX ADMIN — OLANZAPINE 5 MILLIGRAM(S): 10 TABLET ORAL at 10:29

## 2025-03-25 RX ADMIN — Medication 2 MILLIGRAM(S): at 15:26

## 2025-03-25 RX ADMIN — Medication 1 MILLIGRAM(S): at 11:23

## 2025-03-25 RX ADMIN — HALOPERIDOL 3 MILLIGRAM(S): 10 TABLET ORAL at 15:26

## 2025-03-25 NOTE — PROGRESS NOTE ADULT - ASSESSMENT
Jordy Hester is an 79 year old male with PMHx of HLD and Alzheimer's dementia who presented to the ED on 3/23/25 for complaints of inability to move left hand and admitted for TIA vs. CVA.    TIA vs. CVA  Wife last saw patient at baseline self around 11:30PM night prior to admission  Wife states patient was speaking in 1-2 words when he usually speaks in complete sentences and she noticed he was not moving his left hand around 11AM  NIHSS 11 as per ER physician documentation, not TNK candidate given outside the window for thrombolytic therapy  CT head without acute intracranial findings  CTA brain without vascular stenosis, occlusion, aneurysm or vascular malformation  CTA neck without vessel narrowing or occlusion in the neck  CT brain perfusion with mismatch perfusion defect in the L. cerebral hemisphere suggestive of ischemia, mismatch volume: 42 mL, and matched perfusion defect in the R. cerebellar hemisphere  Neuro checks q4, NPO until SLP eval given failed bedside dysphagia eval as per ER RN  Allowing for permissive HTN up to 220/120 for first 24-48 hours  F/u TSH, lipid panel, A1c for risk stratification  F/u echocardiogram with bubble study, MRI brain  PT/OT consulted  Telemetry to monitor for arrhythmias  Neurology consulted, recommended MRI brain but if patient is too agitated can get CTH 3/25, ASA, statin, DVT ppx    Alzheimer's dementia: PTA donepezil 10 mg qhs, memantine 10 mg BID, frequent turning and repositioning q2  S/p IV zyprexa 3/24 due to agitation   Can start seroquel qhs if patient becomes agitated again   Psych consult     Prediabetes   A1c 5.9 on admission   POC glucose       Chronic medical conditions:  HLD: PTA simvastatin 20 mg qhs reordered as atorvastatin 10 mg qhs given simvastatin not on formulary      Medication reconciliation completed using med list provided by wife over the phone.    Called daughter Dr. Tanya Hester (anesthesiologist, [phone]) for clinical updates, no answer. Left VM to call back when she can.  Jordy Hester is an 79 year old male with PMHx of HLD and Alzheimer's dementia who presented to the ED on 3/23/25 for complaints of inability to move left hand and admitted for TIA vs. CVA.    TIA vs. CVA  Wife last saw patient at baseline self around 11:30PM night prior to admission  Wife states patient was speaking in 1-2 words when he usually speaks in complete sentences and she noticed he was not moving his left hand around 11AM  NIHSS 11 as per ER physician documentation, not TNK candidate given outside the window for thrombolytic therapy  CT head without acute intracranial findings  CTA brain without vascular stenosis, occlusion, aneurysm or vascular malformation  CTA neck without vessel narrowing or occlusion in the neck  CT brain perfusion with mismatch perfusion defect in the L. cerebral hemisphere suggestive of ischemia, mismatch volume: 42 mL, and matched perfusion defect in the R. cerebellar hemisphere  Neuro checks q4  Unable to get SLP eval due to agitation, spoke with daughter who states patient has no diet restrictions she is ok with resuming puree diet without SLP eval given he is too agitated for full eval   Allowing for permissive HTN up to 220/120 for first 24-48 hours  A1c 5.9,  will increase statin   F/u echocardiogram with bubble study   PT/OT consulted  Telemetry to monitor for arrhythmias  Neurology consulted, recommended MRI brain but if patient is too agitated can get CTH 3/25, ASA, statin, DVT ppx    Alzheimer's dementia: PTA donepezil 10 mg qhs, memantine 10 mg BID, frequent turning and repositioning q2  S/p IV zyprexa 3/24 due to agitation   Can start seroquel qhs if patient becomes agitated again   Psych consult     Prediabetes   A1c 5.9 on admission   POC glucose       Chronic medical conditions:  HLD: PTA simvastatin 20 mg qhs reordered as atorvastatin 10 mg qhs given simvastatin not on formulary-->atorvastatin 40 given LDL not at goal.       Medication reconciliation completed using med list provided by wife over the phone.    Called daughter Dr. Tanya Hester (anesthesiologist, [phone]) for clinical updates, states she is okay with starting puree diet as she appreciates we can't get SLP evaluation while patient is agitated. States she will call the patient's wife to try and come to bedside and help re-direct the patient.

## 2025-03-25 NOTE — PROGRESS NOTE ADULT - SUBJECTIVE AND OBJECTIVE BOX
Patient seen and examined this am. No new events    MEDICATIONS:    acetaminophen     Tablet .. 650 milliGRAM(s) Oral every 6 hours PRN  aluminum hydroxide/magnesium hydroxide/simethicone Suspension 30 milliLiter(s) Oral every 4 hours PRN  aspirin  chewable 81 milliGRAM(s) Oral daily  atorvastatin 10 milliGRAM(s) Oral at bedtime  donepezil 10 milliGRAM(s) Oral at bedtime  enoxaparin Injectable 40 milliGRAM(s) SubCutaneous every 24 hours  melatonin 3 milliGRAM(s) Oral at bedtime PRN  memantine 10 milliGRAM(s) Oral two times a day  OLANZapine Injectable 5 milliGRAM(s) IntraMuscular every 6 hours PRN  ondansetron Injectable 4 milliGRAM(s) IV Push every 8 hours PRN      LABS:                          13.0   5.83  )-----------( 248      ( 25 Mar 2025 06:53 )             39.9     03-25    141  |  107  |  21  ----------------------------<  80  3.9   |  25  |  0.97    Ca    9.2      25 Mar 2025 06:53  Phos  3.9     03-25  Mg     2.3     03-25    TPro  7.8  /  Alb  3.8  /  TBili  1.2  /  DBili  x   /  AST  22  /  ALT  25  /  AlkPhos  86  03-23    CAPILLARY BLOOD GLUCOSE        PT/INR - ( 23 Mar 2025 15:13 )   PT: 11.6 sec;   INR: 1.00 ratio         PTT - ( 23 Mar 2025 15:13 )  PTT:35.2 sec  Urinalysis Basic - ( 25 Mar 2025 06:53 )    Color: x / Appearance: x / SG: x / pH: x  Gluc: 80 mg/dL / Ketone: x  / Bili: x / Urobili: x   Blood: x / Protein: x / Nitrite: x   Leuk Esterase: x / RBC: x / WBC x   Sq Epi: x / Non Sq Epi: x / Bacteria: x      I&O's Summary    Vital Signs Last 24 Hrs  T(C): 36.8 (25 Mar 2025 04:23), Max: 37.1 (24 Mar 2025 23:25)  T(F): 98.2 (25 Mar 2025 04:23), Max: 98.7 (24 Mar 2025 23:25)  HR: 85 (25 Mar 2025 04:23) (85 - 117)  BP: 123/80 (25 Mar 2025 04:23) (122/77 - 127/83)  BP(mean): --  RR: 18 (25 Mar 2025 04:23) (18 - 18)  SpO2: 95% (25 Mar 2025 04:23) (95% - 97%)    Parameters below as of 25 Mar 2025 04:23  Patient On (Oxygen Delivery Method): room air        On Neurological Examination:    Neuro: eyes opesn perseverates not following commands  CN: PERRL, EOMI, VFF normal gaze preference, no facial palsy,     Motor: PHILIP seems to have possible LUE dysmetria    Sensory: Intact to LT and PP no extinction    Coordination: FTN intact b/l            GENERAL Exam:     Nontoxic , No Acute Distress   	  HEENT:  normocephalic, atraumatic  		  LUNGS:	Clear bilaterally  No Wheeze    	  HEART:	 Normal S1S2   No murmur RRR        	  GI/ ABDOMEN:  Soft  Non tender    EXTREMITIES:   No Edema  No Clubbing  No Cyanosis No Edema    MUSCULOSKELETAL: Normal Range of Motion  	   SKIN:      Normal   No Ecchymosis      RADIOLOGY  < from: CT Brain Stroke Protocol (03.23.25 @ 15:08) >  CTA HEAD:    Evaluation of the anterior circulation demonstrates scattered   atherosclerotic calcifications along the distal internal carotid arteries.    The proximal anterior, middle and posterior cerebral arteries are patent   bilaterally.    Evaluation of the posterior circulation demonstrates patent   vertebrobasilar system.    No evidence of vascular stenosis, occlusion, aneurysm or vascular  malformation in the [place].        IMPRESSION:    CT HEAD: No acute intracranial findings.    Findings discussed with Dr. Werner of the patient's clinical team on   3/23/2025 at 3:12 PM.    CT PERFUSION: Mismatch perfusion defect in the left cerebral hemisphere   suggestive of ischemia. Mismatch volume: 42 mL.    < end of copied text >

## 2025-03-25 NOTE — PROGRESS NOTE ADULT - SUBJECTIVE AND OBJECTIVE BOX
Patient is a 79y old  Male who presents with a chief complaint of TIA vs. CVA (24 Mar 2025 15:02)      INTERVAL HPI/OVERNIGHT EVENTS:  -afebrile vitals stable overnight   -seen and examined at bedside     MEDICATIONS  (STANDING):  aspirin  chewable 81 milliGRAM(s) Oral daily  atorvastatin 10 milliGRAM(s) Oral at bedtime  donepezil 10 milliGRAM(s) Oral at bedtime  enoxaparin Injectable 40 milliGRAM(s) SubCutaneous every 24 hours  memantine 10 milliGRAM(s) Oral two times a day    MEDICATIONS  (PRN):  acetaminophen     Tablet .. 650 milliGRAM(s) Oral every 6 hours PRN Temp greater or equal to 38C (100.4F), Mild Pain (1 - 3)  aluminum hydroxide/magnesium hydroxide/simethicone Suspension 30 milliLiter(s) Oral every 4 hours PRN Dyspepsia  melatonin 3 milliGRAM(s) Oral at bedtime PRN Insomnia  OLANZapine Injectable 5 milliGRAM(s) IntraMuscular every 6 hours PRN agitation  ondansetron Injectable 4 milliGRAM(s) IV Push every 8 hours PRN Nausea and/or Vomiting      Allergies    ACE inhibitors (Angioedema)    Intolerances        REVIEW OF SYSTEMS:  CONSTITUTIONAL: No fever, weight loss, or fatigue  EYES: No eye pain, visual disturbances, or discharge  ENMT:  No difficulty hearing, tinnitus, vertigo; No sinus or throat pain  NECK: No pain or stiffness  BREASTS: No pain, masses, or nipple discharge  RESPIRATORY: No cough, wheezing, chills or hemoptysis; No shortness of breath  CARDIOVASCULAR: No chest pain, palpitations, dizziness, or leg swelling  GASTROINTESTINAL: No abdominal or epigastric pain. No nausea, vomiting, or hematemesis; No diarrhea or constipation. No melena or hematochezia.  GENITOURINARY: No dysuria, frequency, hematuria, or incontinence  NEUROLOGICAL: No headaches, memory loss, loss of strength, numbness, or tremors  SKIN: No itching, burning, rashes, or lesions   LYMPH NODES: No enlarged glands  ENDOCRINE: No heat or cold intolerance; No hair loss  MUSCULOSKELETAL: No joint pain or swelling; No muscle, back, or extremity pain  PSYCHIATRIC: No depression, anxiety, mood swings, or difficulty sleeping  HEME/LYMPH: No easy bruising, or bleeding gums  ALLERGY AND IMMUNOLOGIC: No hives or eczema    Vital Signs Last 24 Hrs  T(C): 36.8 (25 Mar 2025 04:23), Max: 37.1 (24 Mar 2025 23:25)  T(F): 98.2 (25 Mar 2025 04:23), Max: 98.7 (24 Mar 2025 23:25)  HR: 85 (25 Mar 2025 04:23) (73 - 117)  BP: 123/80 (25 Mar 2025 04:23) (122/77 - 127/83)  BP(mean): --  RR: 18 (25 Mar 2025 04:23) (18 - 18)  SpO2: 95% (25 Mar 2025 04:23) (95% - 98%)    Parameters below as of 25 Mar 2025 04:23  Patient On (Oxygen Delivery Method): room air        PHYSICAL EXAM:  GENERAL: NAD, well-groomed, well-developed  HEAD:  Atraumatic, Normocephalic  EYES: EOMI, sclera non-icteric  ENMT:  Moist mucous membranes  NERVOUS SYSTEM:  Alert & Oriented to name only   CHEST/LUNG: Clear to percussion bilaterally  HEART: Regular rate and rhythm  ABDOMEN: Soft, Nontender, Nondistended  EXTREMITIES:  no LE edema   SKIN: warm, dry    LABS:                        13.0   5.83  )-----------( 248      ( 25 Mar 2025 06:53 )             39.9     03-25    141  |  107  |  21  ----------------------------<  80  3.9   |  25  |  0.97    Ca    9.2      25 Mar 2025 06:53  Phos  3.9     03-25  Mg     2.3     03-25    TPro  7.8  /  Alb  3.8  /  TBili  1.2  /  DBili  x   /  AST  22  /  ALT  25  /  AlkPhos  86  03-23    PT/INR - ( 23 Mar 2025 15:13 )   PT: 11.6 sec;   INR: 1.00 ratio         PTT - ( 23 Mar 2025 15:13 )  PTT:35.2 sec  Urinalysis Basic - ( 25 Mar 2025 06:53 )    Color: x / Appearance: x / SG: x / pH: x  Gluc: 80 mg/dL / Ketone: x  / Bili: x / Urobili: x   Blood: x / Protein: x / Nitrite: x   Leuk Esterase: x / RBC: x / WBC x   Sq Epi: x / Non Sq Epi: x / Bacteria: x      CAPILLARY BLOOD GLUCOSE          RADIOLOGY & ADDITIONAL TESTS:    Imaging Personally Reviewed:  [ X] YES  [ ] NO    Consultant(s) Notes Reviewed:  [ X] YES  [ ] NO    Care Discussed with Consultants/Other Providers [X ] YES  [ ] NO Patient is a 79y old  Male who presents with a chief complaint of TIA vs. CVA (24 Mar 2025 15:02)      INTERVAL HPI/OVERNIGHT EVENTS:  -afebrile vitals stable overnight   -seen and examined at bedside   -agitated try to get out of bed, not re-directable     MEDICATIONS  (STANDING):  aspirin  chewable 81 milliGRAM(s) Oral daily  atorvastatin 10 milliGRAM(s) Oral at bedtime  donepezil 10 milliGRAM(s) Oral at bedtime  enoxaparin Injectable 40 milliGRAM(s) SubCutaneous every 24 hours  memantine 10 milliGRAM(s) Oral two times a day    MEDICATIONS  (PRN):  acetaminophen     Tablet .. 650 milliGRAM(s) Oral every 6 hours PRN Temp greater or equal to 38C (100.4F), Mild Pain (1 - 3)  aluminum hydroxide/magnesium hydroxide/simethicone Suspension 30 milliLiter(s) Oral every 4 hours PRN Dyspepsia  melatonin 3 milliGRAM(s) Oral at bedtime PRN Insomnia  OLANZapine Injectable 5 milliGRAM(s) IntraMuscular every 6 hours PRN agitation  ondansetron Injectable 4 milliGRAM(s) IV Push every 8 hours PRN Nausea and/or Vomiting      Allergies    ACE inhibitors (Angioedema)    Intolerances        REVIEW OF SYSTEMS:  unable to assess due to mental status     Vital Signs Last 24 Hrs  T(C): 36.8 (25 Mar 2025 04:23), Max: 37.1 (24 Mar 2025 23:25)  T(F): 98.2 (25 Mar 2025 04:23), Max: 98.7 (24 Mar 2025 23:25)  HR: 85 (25 Mar 2025 04:23) (73 - 117)  BP: 123/80 (25 Mar 2025 04:23) (122/77 - 127/83)  BP(mean): --  RR: 18 (25 Mar 2025 04:23) (18 - 18)  SpO2: 95% (25 Mar 2025 04:23) (95% - 98%)    Parameters below as of 25 Mar 2025 04:23  Patient On (Oxygen Delivery Method): room air        PHYSICAL EXAM:  GENERAL: NAD, well-groomed, well-developed  HEAD:  Atraumatic, Normocephalic  EYES: EOMI, sclera non-icteric  ENMT:  Moist mucous membranes  NERVOUS SYSTEM:  Alert & Oriented to name only agitated   CHEST/LUNG: Clear to percussion bilaterally  HEART: Regular rate and rhythm  ABDOMEN: Soft, Nontender, Nondistended  EXTREMITIES:  no LE edema   SKIN: warm, dry    LABS:                        13.0   5.83  )-----------( 248      ( 25 Mar 2025 06:53 )             39.9     03-25    141  |  107  |  21  ----------------------------<  80  3.9   |  25  |  0.97    Ca    9.2      25 Mar 2025 06:53  Phos  3.9     03-25  Mg     2.3     03-25    TPro  7.8  /  Alb  3.8  /  TBili  1.2  /  DBili  x   /  AST  22  /  ALT  25  /  AlkPhos  86  03-23    PT/INR - ( 23 Mar 2025 15:13 )   PT: 11.6 sec;   INR: 1.00 ratio         PTT - ( 23 Mar 2025 15:13 )  PTT:35.2 sec  Urinalysis Basic - ( 25 Mar 2025 06:53 )    Color: x / Appearance: x / SG: x / pH: x  Gluc: 80 mg/dL / Ketone: x  / Bili: x / Urobili: x   Blood: x / Protein: x / Nitrite: x   Leuk Esterase: x / RBC: x / WBC x   Sq Epi: x / Non Sq Epi: x / Bacteria: x      CAPILLARY BLOOD GLUCOSE          RADIOLOGY & ADDITIONAL TESTS:    Imaging Personally Reviewed:  [ X] YES  [ ] NO    Consultant(s) Notes Reviewed:  [ X] YES  [ ] NO    Care Discussed with Consultants/Other Providers [X ] YES  [ ] NO

## 2025-03-25 NOTE — BH CONSULTATION LIAISON ASSESSMENT NOTE - NSBHCONSULTRECOMMENDOTHER_PSY_A_CORE FT
- continue home meds of Aricept 10mg PO qhs and Namenda 10mg PO bid  - continue home meds of Aricept 10mg PO qhs and Namenda 10mg PO bid   - start risperdal M tab 1mg po bid (Pt is not antipsychotic naive; was on Seroquel for a few months) for delirium-induced agitation; may not open mouth / resistant to any PO intake as per nursing so may need IVP route at this time  - STAT haldol 3mg IVP with Ativan 2mg IVP for acute severe agitation/restlessness withy goal for Patient to be calm and comfortable enough so to be taken out of wrist restraints   -  can use haldol 3mg IVP with Ativan 2mg IVP q6hrs for 24 hours until agitation severity reduces than switch to PO   - QTc 444 msec (within normal limits); will repeat after 24 hrs   - proposed plan discussed with pt's wife as well as risks/benefit; wife gave verbal consent   - no capacity to make his medical decisions; Pt's wife is his decision-maker

## 2025-03-25 NOTE — BH CONSULTATION LIAISON ASSESSMENT NOTE - NSBHLOCFT_PSY_A_CORE
laying in bed askew, part of legs over bedrail, wrists in soft restraints, eyes are closed but Patient is awake and actively, continuously talking to himself. Sentences are nonsensical. He does not follow direction, does not open eyes, does not engage in ny forms of meaningful conversation/ communication

## 2025-03-25 NOTE — BH CONSULTATION LIAISON ASSESSMENT NOTE - CURRENT MEDICATION
MEDICATIONS  (STANDING):  aspirin  chewable 81 milliGRAM(s) Oral daily  atorvastatin 40 milliGRAM(s) Oral at bedtime  donepezil 10 milliGRAM(s) Oral at bedtime  enoxaparin Injectable 40 milliGRAM(s) SubCutaneous every 24 hours  memantine 10 milliGRAM(s) Oral two times a day    MEDICATIONS  (PRN):  acetaminophen     Tablet .. 650 milliGRAM(s) Oral every 6 hours PRN Temp greater or equal to 38C (100.4F), Mild Pain (1 - 3)  aluminum hydroxide/magnesium hydroxide/simethicone Suspension 30 milliLiter(s) Oral every 4 hours PRN Dyspepsia  melatonin 3 milliGRAM(s) Oral at bedtime PRN Insomnia  OLANZapine Injectable 5 milliGRAM(s) IntraMuscular every 6 hours PRN agitation  ondansetron Injectable 4 milliGRAM(s) IV Push every 8 hours PRN Nausea and/or Vomiting

## 2025-03-25 NOTE — BH CONSULTATION LIAISON ASSESSMENT NOTE - SUMMARY
Moderate-Stage Alzheimer's dementia with presenting clinical symptoms and CT imaging consistent with a CVA. Patient could not tolerate an MRI.  Moderate-Stage Alzheimer's dementia with presenting clinical symptoms and CT imaging consistent with a CVA. Patient could not tolerate an MRI. Post=CVA delirium, confusion with significant agitatio for which treatment is indicated.

## 2025-03-25 NOTE — PROGRESS NOTE ADULT - ASSESSMENT
79 year old male with PMHx of HLD and Alzheimer's dementia who presents with worsening confusion, per wife moving left hand less. Exam eyes open perseverates, not following commands seems to have possible LUE dysmetria   CTH - CTA no LVO  CTP Mismatch perfusion defect in the left cerebral hemisphere   suggestive of ischemia. M    Impression: AMS, r/o stoke    MRI brain - could not tolerate, consider repeat CT head  If MRI unrevealing, would obtain an EEG  Aspirin for secondary stroke prevention at this time.   Atorvastatin , titrate the dose according to LDL.   TTE and tele  DVT prophylaxis, Neurochecks   gradual normotension.   HbA1C and LDL.   PT/OT/Speech and swallow/safety eval.

## 2025-03-25 NOTE — BH CONSULTATION LIAISON ASSESSMENT NOTE - RISK ASSESSMENT
Chronic risk factors: advanced age, male gender, neurodegenerative illness. Protective factors: , medication and treatment compliant; no formal past psychiatric history; no known suicide attempts; no self-injurious behavior; no hx of aggression/violence; no substance use, no legal issues; stable domicile, strong family support; access to health services. No acute risk factors identified

## 2025-03-25 NOTE — BH CONSULTATION LIAISON ASSESSMENT NOTE - OTHER
does not seem to be responding to internal stimuli  deferred  unable to ascertain given exam limitations  looks confused

## 2025-03-25 NOTE — BH CONSULTATION LIAISON ASSESSMENT NOTE - NSBHCHARTREVIEWLAB_PSY_A_CORE FT
03-25    141  |  107  |  21  ----------------------------<  80  3.9   |  25  |  0.97    Ca    9.2      25 Mar 2025 06:53  Phos  3.9     03-25  Mg     2.3     03-25    TPro  7.8  /  Alb  3.8  /  TBili  1.2  /  DBili  x   /  AST  22  /  ALT  25  /  AlkPhos  86  03-23

## 2025-03-25 NOTE — BH CONSULTATION LIAISON ASSESSMENT NOTE - HPI (INCLUDE ILLNESS QUALITY, SEVERITY, DURATION, TIMING, CONTEXT, MODIFYING FACTORS, ASSOCIATED SIGNS AND SYMPTOMS)
78 yo  male, noncaregiver, retired, ambulates unassisted and independent with most ADLs, requires assistance with showering, lives at home with family (wife, daughter, and son-in-law),  with Alzheimer's dementia, HTN, Arthritis, HLD, hx of synxope, hx of bradycardia (seen by electrophysiology at [facility]), hx of left direct and indirect inguinal hernias/repair, admitted from home with worsening confusion, per wife moving left hand less. MRI brain - could not tolerate, Neurology following. MOLST in chart, DNR/DNI.  80 yo  male, noncaregiver, retired, ambulates unassisted and independent with most ADLs, requires assistance with showering, lives at home with family (wife, daughter, and son-in-law),  with Alzheimer's dementia and follows with outpt Neurologist from [facility] (phone [phone]), HTN, Arthritis, HLD, hx of synxope, hx of bradycardia (seen by electrophysiology at [facility]), hx of left direct and indirect inguinal hernias/repair, admitted from home with worsening confusion, per wife moving left hand less. MRI brain - could not tolerate, Neurology following. MOLST in chart, DNR/DNI Patient last received Zyprexa 5mg IM > 5 hours ago with limited effect. Patient has remained agitated, restless, confused and required soft wrist restraints.      EXAM: Patient is laying in bed askew, part of legs over bedrail, wrists in soft restraints, eyes are closed but Patient is awake and actively, continuously talking to himself. Sentences are nonsensical. He does not follow direction, does not open eyes, does not engage in ny forms of meaningful conversation/ communication.     COLLATERAL FROM PATIENT'S WIFE AT BEDSIDE: current state way off baseline. Advocated for MRI and understands Patient would have to be sedated for it (their daughter is an Anesthesiologist). Patient has outpt Neurologist for medication management, was on bedtime Seroquel for a few months to help with sleep which it did; no longer on it. Patient was also taken off his BP meds x 3 months due to normalized reads. Patient is medication compliant. Writer discussed treatment options including antipsychotics to be indicated to be used at this time given patient's distressing agitation, restlessness and increased risk to unintentionally harm himself as he is struggling against restraints, askew in bed etc.  FDA Black[employer] warning with antipsychotic use in elderly dementia patients discussed with pt's wife, risks v benefits discussed, and Pt's wife gave verbal consent for antipsychotics to be used for acute agitation management.

## 2025-03-25 NOTE — BH CONSULTATION LIAISON ASSESSMENT NOTE - NSBHCHARTREVIEWINVESTIGATE_PSY_A_CORE FT
. CTA brain without vascular stenosis, occlusion, aneurysm or vascular malformation. CTA neck without vessel narrowing or occlusion in the neck. CT brain perfusion with mismatch perfusion defect in the left cerebral hemisphere suggestive of ischemia.

## 2025-03-25 NOTE — BH CONSULTATION LIAISON ASSESSMENT NOTE - NSBHCHARTREVIEWVS_PSY_A_CORE FT
Vital Signs Last 24 Hrs  T(C): 37 (25 Mar 2025 10:39), Max: 37.1 (24 Mar 2025 23:25)  T(F): 98.6 (25 Mar 2025 10:39), Max: 98.7 (24 Mar 2025 23:25)  HR: 110 (25 Mar 2025 11:20) (85 - 117)  BP: 121/82 (25 Mar 2025 11:20) (118/77 - 127/83)  BP(mean): --  RR: 16 (25 Mar 2025 10:39) (16 - 18)  SpO2: 98% (25 Mar 2025 11:20) (95% - 98%)    Parameters below as of 25 Mar 2025 11:20  Patient On (Oxygen Delivery Method): room air

## 2025-03-26 ENCOUNTER — RESULT REVIEW (OUTPATIENT)
Age: 80
End: 2025-03-26

## 2025-03-26 LAB
ANION GAP SERPL CALC-SCNC: 7 MMOL/L — SIGNIFICANT CHANGE UP (ref 5–17)
BUN SERPL-MCNC: 22 MG/DL — SIGNIFICANT CHANGE UP (ref 7–23)
CALCIUM SERPL-MCNC: 9.2 MG/DL — SIGNIFICANT CHANGE UP (ref 8.5–10.1)
CHLORIDE SERPL-SCNC: 108 MMOL/L — SIGNIFICANT CHANGE UP (ref 96–108)
CO2 SERPL-SCNC: 28 MMOL/L — SIGNIFICANT CHANGE UP (ref 22–31)
CREAT SERPL-MCNC: 0.89 MG/DL — SIGNIFICANT CHANGE UP (ref 0.5–1.3)
EGFR: 87 ML/MIN/1.73M2 — SIGNIFICANT CHANGE UP
EGFR: 87 ML/MIN/1.73M2 — SIGNIFICANT CHANGE UP
GLUCOSE SERPL-MCNC: 68 MG/DL — LOW (ref 70–99)
HCT VFR BLD CALC: 40 % — SIGNIFICANT CHANGE UP (ref 39–50)
HGB BLD-MCNC: 12.7 G/DL — LOW (ref 13–17)
MAGNESIUM SERPL-MCNC: 2.3 MG/DL — SIGNIFICANT CHANGE UP (ref 1.6–2.6)
MCHC RBC-ENTMCNC: 28.5 PG — SIGNIFICANT CHANGE UP (ref 27–34)
MCHC RBC-ENTMCNC: 31.8 G/DL — LOW (ref 32–36)
MCV RBC AUTO: 89.7 FL — SIGNIFICANT CHANGE UP (ref 80–100)
NRBC BLD AUTO-RTO: 0 /100 WBCS — SIGNIFICANT CHANGE UP (ref 0–0)
PHOSPHATE SERPL-MCNC: 3.6 MG/DL — SIGNIFICANT CHANGE UP (ref 2.5–4.5)
PLATELET # BLD AUTO: 234 K/UL — SIGNIFICANT CHANGE UP (ref 150–400)
POTASSIUM SERPL-MCNC: 3.7 MMOL/L — SIGNIFICANT CHANGE UP (ref 3.5–5.3)
POTASSIUM SERPL-SCNC: 3.7 MMOL/L — SIGNIFICANT CHANGE UP (ref 3.5–5.3)
RBC # BLD: 4.46 M/UL — SIGNIFICANT CHANGE UP (ref 4.2–5.8)
RBC # FLD: 14.9 % — HIGH (ref 10.3–14.5)
SODIUM SERPL-SCNC: 143 MMOL/L — SIGNIFICANT CHANGE UP (ref 135–145)
WBC # BLD: 5.18 K/UL — SIGNIFICANT CHANGE UP (ref 3.8–10.5)
WBC # FLD AUTO: 5.18 K/UL — SIGNIFICANT CHANGE UP (ref 3.8–10.5)

## 2025-03-26 PROCEDURE — 99231 SBSQ HOSP IP/OBS SF/LOW 25: CPT

## 2025-03-26 PROCEDURE — 93306 TTE W/DOPPLER COMPLETE: CPT | Mod: 26

## 2025-03-26 PROCEDURE — 99232 SBSQ HOSP IP/OBS MODERATE 35: CPT

## 2025-03-26 RX ORDER — HALOPERIDOL 10 MG/1
3 TABLET ORAL ONCE
Refills: 0 | Status: COMPLETED | OUTPATIENT
Start: 2025-03-26 | End: 2025-03-26

## 2025-03-26 RX ADMIN — DONEPEZIL HYDROCHLORIDE 10 MILLIGRAM(S): 5 TABLET ORAL at 22:49

## 2025-03-26 RX ADMIN — HALOPERIDOL 3 MILLIGRAM(S): 10 TABLET ORAL at 17:26

## 2025-03-26 RX ADMIN — ENOXAPARIN SODIUM 40 MILLIGRAM(S): 100 INJECTION SUBCUTANEOUS at 04:45

## 2025-03-26 RX ADMIN — Medication 1 MILLIGRAM(S): at 05:52

## 2025-03-26 RX ADMIN — SODIUM CHLORIDE 75 MILLILITER(S): 9 INJECTION, SOLUTION INTRAVENOUS at 20:03

## 2025-03-26 RX ADMIN — ATORVASTATIN CALCIUM 40 MILLIGRAM(S): 80 TABLET, FILM COATED ORAL at 22:49

## 2025-03-26 NOTE — PROGRESS NOTE ADULT - SUBJECTIVE AND OBJECTIVE BOX
Patient seen and examined this am.     MEDICATIONS:    acetaminophen     Tablet .. 650 milliGRAM(s) Oral every 6 hours PRN  aluminum hydroxide/magnesium hydroxide/simethicone Suspension 30 milliLiter(s) Oral every 4 hours PRN  aspirin  chewable 81 milliGRAM(s) Oral daily  atorvastatin 40 milliGRAM(s) Oral at bedtime  donepezil 10 milliGRAM(s) Oral at bedtime  enoxaparin Injectable 40 milliGRAM(s) SubCutaneous every 24 hours  lactated ringers. 1000 milliLiter(s) IV Continuous <Continuous>  melatonin 3 milliGRAM(s) Oral at bedtime PRN  memantine 10 milliGRAM(s) Oral two times a day  ondansetron Injectable 4 milliGRAM(s) IV Push every 8 hours PRN  risperiDONE  Disintegrating Tablet 1 milliGRAM(s) Oral two times a day      LABS:                          12.7   5.18  )-----------( 234      ( 26 Mar 2025 05:55 )             40.0     03-26    143  |  108  |  22  ----------------------------<  68[L]  3.7   |  28  |  0.89    Ca    9.2      26 Mar 2025 05:55  Phos  3.6     03-26  Mg     2.3     03-26      CAPILLARY BLOOD GLUCOSE          Urinalysis Basic - ( 26 Mar 2025 05:55 )    Color: x / Appearance: x / SG: x / pH: x  Gluc: 68 mg/dL / Ketone: x  / Bili: x / Urobili: x   Blood: x / Protein: x / Nitrite: x   Leuk Esterase: x / RBC: x / WBC x   Sq Epi: x / Non Sq Epi: x / Bacteria: x      I&O's Summary    Vital Signs Last 24 Hrs  T(C): 36.9 (26 Mar 2025 04:45), Max: 37.1 (25 Mar 2025 23:38)  T(F): 98.5 (26 Mar 2025 04:45), Max: 98.7 (25 Mar 2025 23:38)  HR: 62 (26 Mar 2025 04:45) (62 - 110)  BP: 135/82 (26 Mar 2025 04:45) (118/77 - 158/95)  BP(mean): --  RR: 18 (26 Mar 2025 04:45) (16 - 18)  SpO2: 97% (26 Mar 2025 04:45) (95% - 99%)    Parameters below as of 26 Mar 2025 04:45  Patient On (Oxygen Delivery Method): room air          On Neurological Examination:    Neuro: eyes opesn perseverates not following commands  CN: PERRL, EOMI, VFF normal gaze preference, no facial palsy,     Motor: CEDRICK seems to have possible LUE dysmetria    Sensory: Intact to LT and PP no extinction    Coordination: FTN intact b/l            GENERAL Exam:     Nontoxic , No Acute Distress   	  HEENT:  normocephalic, atraumatic  		  LUNGS:	Clear bilaterally  No Wheeze    	  HEART:	 Normal S1S2   No murmur RRR        	  GI/ ABDOMEN:  Soft  Non tender    EXTREMITIES:   No Edema  No Clubbing  No Cyanosis No Edema    MUSCULOSKELETAL: Normal Range of Motion  	   SKIN:      Normal   No Ecchymosis      RADIOLOGY  < from: CT Brain Stroke Protocol (03.23.25 @ 15:08) >  CTA HEAD:    Evaluation of the anterior circulation demonstrates scattered   atherosclerotic calcifications along the distal internal carotid arteries.    The proximal anterior, middle and posterior cerebral arteries are patent   bilaterally.    Evaluation of the posterior circulation demonstrates patent   vertebrobasilar system.    No evidence of vascular stenosis, occlusion, aneurysm or vascular  malformation in the [place].        IMPRESSION:    CT HEAD: No acute intracranial findings.    Findings discussed with Dr. Werner of the patient's clinical team on   3/23/2025 at 3:12 PM.    CT PERFUSION: Mismatch perfusion defect in the left cerebral hemisphere   suggestive of ischemia. Mismatch volume: 42 mL.    < end of copied text >        < from: CT Head No Cont (03.25.25 @ 17:10) >    IMPRESSION:  Degraded by motion.  No evidence of an acute intracranial hemorrhage, midline shift, or   hydrocephalus.  Atrophy with confluent white matter ischemic changes.  No significant interval change from prior exam.    < end of copied text >

## 2025-03-26 NOTE — PROGRESS NOTE ADULT - ASSESSMENT
79 year old male with PMHx of HLD and Alzheimer's dementia who presents with worsening confusion, per wife moving left hand less. Exam eyes open perseverates, not following commands seems to have possible LUE dysmetria   CTH - CTA no LVO  CTP Mismatch perfusion defect in the left cerebral hemisphere   suggestive of ischemia. M  repeat CTH 3/25 no acute finding    Impression: AMS, underlying dementia, delirium, stable repeat CTh precludes a large CVA, can not entirely exclude small infarcts    psychiatry input appreciated  MRI brain - could not tolerate,   routine  EEG  Aspirin for secondary stroke prevention at this time.   Atorvastatin , titrate the dose according to LDL.   TTE and tele  DVT prophylaxis,    gradual normotension.   HbA1C and LDL.   PT/OT/Speech and swallow/safety eval.

## 2025-03-26 NOTE — BH CONSULTATION LIAISON PROGRESS NOTE - NSBHFUPINTERVALHXFT_PSY_A_CORE
Patient refused all PO medication today. Patient did not receive any subsequent IM/IVP PRNs for agitation since 3pm yesterday. Repeat CT head no contrast same as initial study.  Patient refused all PO medication today. Patient did not receive any subsequent IM/IVP PRNs for agitation since 3pm yesterday - showed clinical response to the haldol/Ativan combo as has been less restless and now has his eyes open and reacts to his name being called.. Repeat CT head no contrast same as initial study. Wife at bedside reports that he is slightly better but remains very confused and talks about seeing construction on the ceiling and keeps saying 'I am no ant man.' In support of IVP haldol repeat given ongoing agitation, AMS and refusal of PO medications.

## 2025-03-26 NOTE — BH CONSULTATION LIAISON PROGRESS NOTE - NSBHATTESTBILLING_PSY_A_CORE
99222-Initial OBS or IP - moderate complexity OR 55-74 mins 30467-Bkfwcgpebi - low complexity OR 20-29 mins

## 2025-03-26 NOTE — PROGRESS NOTE ADULT - SUBJECTIVE AND OBJECTIVE BOX
79 year old male with history of HLD and Alzheimer's dementia who presented to the ED on 3/23/25 for complaints of inability to move left hand and admitted for CVA. He is lying in bed in NAD.    MEDICATIONS  (STANDING):  aspirin  chewable 81 milliGRAM(s) Oral daily  atorvastatin 40 milliGRAM(s) Oral at bedtime  donepezil 10 milliGRAM(s) Oral at bedtime  enoxaparin Injectable 40 milliGRAM(s) SubCutaneous every 24 hours  lactated ringers. 1000 milliLiter(s) (75 mL/Hr) IV Continuous <Continuous>  memantine 10 milliGRAM(s) Oral two times a day  risperiDONE  Disintegrating Tablet 1 milliGRAM(s) Oral two times a day    MEDICATIONS  (PRN):  acetaminophen     Tablet .. 650 milliGRAM(s) Oral every 6 hours PRN Temp greater or equal to 38C (100.4F), Mild Pain (1 - 3)  aluminum hydroxide/magnesium hydroxide/simethicone Suspension 30 milliLiter(s) Oral every 4 hours PRN Dyspepsia  melatonin 3 milliGRAM(s) Oral at bedtime PRN Insomnia  ondansetron Injectable 4 milliGRAM(s) IV Push every 8 hours PRN Nausea and/or Vomiting      Allergies    ACE inhibitors (Angioedema)    Intolerances        Vital Signs Last 24 Hrs  T(C): 36.7 (26 Mar 2025 10:43), Max: 37.1 (25 Mar 2025 23:38)  T(F): 98 (26 Mar 2025 10:43), Max: 98.7 (25 Mar 2025 23:38)  HR: 78 (26 Mar 2025 10:43) (62 - 101)  BP: 126/79 (26 Mar 2025 10:43) (126/79 - 158/95)  BP(mean): --  RR: 18 (26 Mar 2025 10:43) (18 - 18)  SpO2: 97% (26 Mar 2025 10:43) (95% - 99%)    Parameters below as of 26 Mar 2025 04:45  Patient On (Oxygen Delivery Method): room air        PHYSICAL EXAM:  GENERAL: NAD, well-groomed, well-developed  HEAD:  Atraumatic, Normocephalic  EYES: EOMI, PERRLA   NECK: Supple   NERVOUS SYSTEM:  Alert    CHEST/LUNG: Clear to auscultation bilaterally; No rales, rhonchi, wheezing, or rubs  HEART: Regular rate and rhythm; No murmurs, rubs, or gallops  ABDOMEN: Soft, Nontender, Nondistended; Bowel sounds present  EXTREMITIES: No clubbing, cyanosis, or edema       LABS:                        12.7   5.18  )-----------( 234      ( 26 Mar 2025 05:55 )             40.0     03-26    143  |  108  |  22  ----------------------------<  68[L]  3.7   |  28  |  0.89    Ca    9.2      26 Mar 2025 05:55  Phos  3.6     03-26  Mg     2.3     03-26        Urinalysis Basic - ( 26 Mar 2025 05:55 )    Color: x / Appearance: x / SG: x / pH: x  Gluc: 68 mg/dL / Ketone: x  / Bili: x / Urobili: x   Blood: x / Protein: x / Nitrite: x   Leuk Esterase: x / RBC: x / WBC x   Sq Epi: x / Non Sq Epi: x / Bacteria: x

## 2025-03-26 NOTE — BH CONSULTATION LIAISON PROGRESS NOTE - NSBHLOCFT_PSY_A_CORE
laying in bed askew, part of legs over bedrail, wrists in soft restraints, eyes are closed but Patient is awake and actively, continuously talking to himself. Sentences are nonsensical. He does not follow direction, does not open eyes, does not engage in ny forms of meaningful conversation/ communication eyes open, less somnolent

## 2025-03-26 NOTE — BH CONSULTATION LIAISON PROGRESS NOTE - OTHER
looks confused  deferred  unable to ascertain given exam limitations  fleeting though better then yesterday

## 2025-03-26 NOTE — PROGRESS NOTE ADULT - ASSESSMENT
79 year old male with history of HLD and Alzheimer's dementia who presented to the ED on 3/23/25 for complaints of inability to move left hand and admitted for CVA.    CVA  - wife last saw patient at baseline self around 11:30PM night prior to admission - wife states patient was speaking in 1-2 words when he usually speaks in complete sentences and she noticed he was not moving his left hand around 11AM  - NIHSS 11 as per ER physician documentation, not TNK candidate given outside the window for thrombolytic therapy  - CT head without acute intracranial findings  - CTA brain without vascular stenosis, occlusion, aneurysm or vascular malformation  - CTA neck without vessel narrowing or occlusion in the neck  - CT brain perfusion with mismatch perfusion defect in the L. cerebral hemisphere suggestive of ischemia, mismatch volume: 42 mL, and matched perfusion defect in the R. cerebellar hemisphere   - Unable to get SLP eval due to agitation, spoke with daughter who states patient has no diet restrictions she is ok with resuming puree diet without SLP eval given he is too agitated for full eval   - A1c 5.9,  will increase statin   - F/u echocardiogram with bubble study   - PT/OT consulted  - Telemetry to monitor for arrhythmias  - Neurology consulted, recommended MRI brain but patient was too agitated & repeat CTH from 3/25 showed no significant interval change from prior exam - as per neurology,  can not entirely exclude small infarcts    - c/w ASA & atorvastatin     Alzheimer's dementia  - c/w Donepezil & memantine    - s/p IV Zyprexa 3/24 due to agitation   - Psych note read and appreciated - patient started on Risperidone & may need haldol 3mg IVP with Ativan 2mg IVP q6hrs for 24 hours until agitation severity reduces than switch to PO     Prediabetes   - A1c 5.9 on admission   - POC glucose     HLD  - c/w atorvastatin     Prophylaxis:  DVT: Lovenox   GI: PO diet     NOK: Pt's daughter Dr. Tanya Hester (anesthesiologist, [phone])    79 year old male with history of HLD and Alzheimer's dementia who presented to the ED on 3/23/25 for complaints of inability to move left hand and admitted for CVA.    CVA  - wife last saw patient at baseline self around 11:30PM night prior to admission - wife states patient was speaking in 1-2 words when he usually speaks in complete sentences and she noticed he was not moving his left hand around 11AM  - NIHSS 11 as per ER physician documentation, not TNK candidate given outside the window for thrombolytic therapy  - CT head without acute intracranial findings  - CTA brain without vascular stenosis, occlusion, aneurysm or vascular malformation  - CTA neck without vessel narrowing or occlusion in the neck  - CT brain perfusion with mismatch perfusion defect in the L. cerebral hemisphere suggestive of ischemia, mismatch volume: 42 mL, and matched perfusion defect in the R. cerebellar hemisphere   - Unable to get SLP eval due to agitation, spoke with daughter who states patient has no diet restrictions she is ok with resuming puree diet without SLP eval given he is too agitated for full eval   - A1c 5.9,  will increase statin   - Echocardiogram showed EF 60-65% w/ mild (grade 1) left ventricular diastolic dysfunction, trace tricuspid regurgitation and agitated saline injection was negative for intracardiac shunt  - PT/OT consulted  - Telemetry to monitor for arrhythmias  - Neurology consulted, recommended MRI brain but patient was too agitated & repeat CTH from 3/25 showed no significant interval change from prior exam - as per neurology, can not entirely exclude small infarcts  - c/w ASA & atorvastatin     Alzheimer's dementia  - c/w Donepezil & memantine    - s/p IV Zyprexa 3/24 due to agitation   - Psych note read and appreciated - patient started on Risperidone & may need haldol 3mg IVP with Ativan 2mg IVP q6hrs for 24 hours until agitation severity reduces than switch to PO     Prediabetes   - A1c 5.9 on admission   - POC glucose     HLD  - c/w atorvastatin     Prophylaxis:  DVT: Lovenox   GI: PO diet     NOK: Pt's daughter Dr. Tanya Hester (anesthesiologist, [phone]) and updated her on his status and plan of care.

## 2025-03-26 NOTE — BH CONSULTATION LIAISON PROGRESS NOTE - NSBHCONSULTRECOMMENDOTHER_PSY_A_CORE FT
3/25/25:  continue home meds of Aricept 10mg PO qhs and Namenda 10mg PO bid   - start risperdal M tab 1mg po bid (Pt is not antipsychotic naive; was on Seroquel for a few months) for delirium-induced agitation; may not open mouth / resistant to any PO intake as per nursing so may need IVP route at this time  - STAT haldol 3mg IVP with Ativan 2mg IVP for acute severe agitation/restlessness withy goal for Patient to be calm and comfortable enough so to be taken out of wrist restraints   -  can use haldol 3mg IVP with Ativan 2mg IVP q6hrs for 24 hours until agitation severity reduces than switch to PO   - QTc 444 msec (within normal limits); will repeat after 24 hrs   - proposed plan discussed with pt's wife as well as risks/benefit; wife gave verbal consent   - no capacity to make his medical decisions; Pt's wife is his decision-maker   3/26/25: Pt refusing all PO medications  3/25/25:  continue home meds of Aricept 10mg PO qhs and Namenda 10mg PO bid   - start risperdal M tab 1mg po bid (Pt is not antipsychotic naive; was on Seroquel for a few months) for delirium-induced agitation; may not open mouth / resistant to any PO intake as per nursing so may need IVP route at this time  - STAT haldol 3mg IVP with Ativan 2mg IVP for acute severe agitation/restlessness withy goal for Patient to be calm and comfortable enough so to be taken out of wrist restraints   -  can use haldol 3mg IVP with Ativan 2mg IVP q6hrs for 24 hours until agitation severity reduces than switch to PO   - QTc 444 msec (within normal limits); will repeat after 24 hrs   - proposed plan discussed with pt's wife as well as risks/benefit; wife gave verbal consent   - no capacity to make his medical decisions; Pt's wife is his decision-maker   3/26/25: Pt refusing all PO medications; STAT haldol 3mg IVP instead of oral agents; discussed with wife at bedside  - no PO intake of food or fluids, continue maintenance IVF

## 2025-03-27 LAB
ANION GAP SERPL CALC-SCNC: 9 MMOL/L — SIGNIFICANT CHANGE UP (ref 5–17)
BUN SERPL-MCNC: 16 MG/DL — SIGNIFICANT CHANGE UP (ref 7–23)
CALCIUM SERPL-MCNC: 9.4 MG/DL — SIGNIFICANT CHANGE UP (ref 8.5–10.1)
CHLORIDE SERPL-SCNC: 109 MMOL/L — HIGH (ref 96–108)
CO2 SERPL-SCNC: 25 MMOL/L — SIGNIFICANT CHANGE UP (ref 22–31)
CREAT SERPL-MCNC: 0.72 MG/DL — SIGNIFICANT CHANGE UP (ref 0.5–1.3)
EGFR: 92 ML/MIN/1.73M2 — SIGNIFICANT CHANGE UP
EGFR: 92 ML/MIN/1.73M2 — SIGNIFICANT CHANGE UP
GLUCOSE SERPL-MCNC: 69 MG/DL — LOW (ref 70–99)
MAGNESIUM SERPL-MCNC: 2.1 MG/DL — SIGNIFICANT CHANGE UP (ref 1.6–2.6)
PHOSPHATE SERPL-MCNC: 3.1 MG/DL — SIGNIFICANT CHANGE UP (ref 2.5–4.5)
POTASSIUM SERPL-MCNC: 3.8 MMOL/L — SIGNIFICANT CHANGE UP (ref 3.5–5.3)
POTASSIUM SERPL-SCNC: 3.8 MMOL/L — SIGNIFICANT CHANGE UP (ref 3.5–5.3)
SODIUM SERPL-SCNC: 143 MMOL/L — SIGNIFICANT CHANGE UP (ref 135–145)

## 2025-03-27 PROCEDURE — 99232 SBSQ HOSP IP/OBS MODERATE 35: CPT

## 2025-03-27 PROCEDURE — 99231 SBSQ HOSP IP/OBS SF/LOW 25: CPT

## 2025-03-27 RX ADMIN — Medication 1 MILLIGRAM(S): at 06:04

## 2025-03-27 RX ADMIN — ATORVASTATIN CALCIUM 40 MILLIGRAM(S): 80 TABLET, FILM COATED ORAL at 22:24

## 2025-03-27 RX ADMIN — MEMANTINE HYDROCHLORIDE 10 MILLIGRAM(S): 21 CAPSULE, EXTENDED RELEASE ORAL at 06:04

## 2025-03-27 RX ADMIN — DONEPEZIL HYDROCHLORIDE 10 MILLIGRAM(S): 5 TABLET ORAL at 22:22

## 2025-03-27 RX ADMIN — ENOXAPARIN SODIUM 40 MILLIGRAM(S): 100 INJECTION SUBCUTANEOUS at 06:04

## 2025-03-27 NOTE — PROGRESS NOTE ADULT - ASSESSMENT
79 year old male with history of HLD and Alzheimer's dementia who presented to the ED on 3/23/25 for complaints of inability to move left hand and admitted for CVA.    CVA  - wife last saw patient at baseline self around 11:30PM night prior to admission - wife states patient was speaking in 1-2 words when he usually speaks in complete sentences and she noticed he was not moving his left hand around 11AM  - NIHSS 11 as per ER physician documentation, not TNK candidate given outside the window for thrombolytic therapy  - CT head without acute intracranial findings  - CTA brain without vascular stenosis, occlusion, aneurysm or vascular malformation  - CTA neck without vessel narrowing or occlusion in the neck  - CT brain perfusion with mismatch perfusion defect in the L. cerebral hemisphere suggestive of ischemia, mismatch volume: 42 mL, and matched perfusion defect in the R. cerebellar hemisphere   - Unable to get SLP eval due to agitation, spoke with daughter who states patient has no diet restrictions she is ok with resuming puree diet without SLP eval given he is too agitated for full eval   - A1c 5.9,  will increase statin   - Echocardiogram showed EF 60-65% w/ mild (grade 1) left ventricular diastolic dysfunction, trace tricuspid regurgitation and agitated saline injection was negative for intracardiac shunt  - PT/OT consulted  - Telemetry to monitor for arrhythmias  - Neurology consulted, recommended MRI brain but patient was too agitated & repeat CTH from 3/25 showed no significant interval change from prior exam - as per neurology, can not entirely exclude small infarcts  - c/w ASA & atorvastatin   -EEG as outpatient as patient is not cooperative     Alzheimer's dementia  - c/w Donepezil & memantine    - s/p IV Zyprexa 3/24 due to agitation   - Psych note read and appreciated - patient started on Risperidone & may need haldol 3mg IVP with Ativan 2mg IVP q6hrs for 24 hours until agitation severity reduces than switch to PO     Prediabetes   - A1c 5.9 on admission   - POC glucose     HLD  - c/w atorvastatin     Prophylaxis:  DVT: Lovenox   GI: PO diet     NOK: Pt's daughter Dr. Tanya Hester (anesthesiologist, [phone]) and updated her on his status and plan of care.

## 2025-03-27 NOTE — BH CONSULTATION LIAISON PROGRESS NOTE - OTHER
looks confused  fleeting though better then yesterday  deferred  unable to ascertain given exam limitations  nods  improving  more euthymic

## 2025-03-27 NOTE — BH CONSULTATION LIAISON PROGRESS NOTE - NSBHFUPINTERVALHXFT_PSY_A_CORE
Patient took PO medications this morning. Tolerated a TTE which was an unremarkable study. Patient did not receive any subsequent IM/IVP PRNs for agitation since 5pm yesterday - showed clinical response to the haldol as has been less restless and now has his eyes open and reacts to his name being called..Wife at bedside reports that he is slightly better but remains very confused and talks about seeing construction on the ceiling and keeps saying 'I am no ant man.' In support of IVP haldol repeat given ongoing agitation, AMS and refusal of PO medications.  Patient took PO medications this morning. Tolerated a TTE which was an unremarkable study. Patient did not receive any subsequent IM/IVP PRNs for agitation since 5pm yesterday - showed clinical response to the haldol again as is doing even better today, Awake, alert, engages and able to answer basic questions such as are you hungry or thirsty, do you have any pain etc.

## 2025-03-27 NOTE — PROGRESS NOTE ADULT - SUBJECTIVE AND OBJECTIVE BOX
79 year old male with history of HLD and Alzheimer's dementia who presented to the ED on 3/23/25 for complaints of inability to move left hand and admitted for CVA. He is lying in bed in NAD.     MEDICATIONS  (STANDING):  aspirin  chewable 81 milliGRAM(s) Oral daily  atorvastatin 40 milliGRAM(s) Oral at bedtime  donepezil 10 milliGRAM(s) Oral at bedtime  enoxaparin Injectable 40 milliGRAM(s) SubCutaneous every 24 hours  lactated ringers. 1000 milliLiter(s) (75 mL/Hr) IV Continuous <Continuous>  memantine 10 milliGRAM(s) Oral two times a day  risperiDONE  Disintegrating Tablet 1 milliGRAM(s) Oral two times a day    MEDICATIONS  (PRN):  acetaminophen     Tablet .. 650 milliGRAM(s) Oral every 6 hours PRN Temp greater or equal to 38C (100.4F), Mild Pain (1 - 3)  aluminum hydroxide/magnesium hydroxide/simethicone Suspension 30 milliLiter(s) Oral every 4 hours PRN Dyspepsia  melatonin 3 milliGRAM(s) Oral at bedtime PRN Insomnia  ondansetron Injectable 4 milliGRAM(s) IV Push every 8 hours PRN Nausea and/or Vomiting      Allergies    ACE inhibitors (Angioedema)    Intolerances        Vital Signs Last 24 Hrs  T(C): 37.2 (27 Mar 2025 15:33), Max: 37.2 (27 Mar 2025 15:33)  T(F): 98.9 (27 Mar 2025 15:33), Max: 98.9 (27 Mar 2025 15:33)  HR: 93 (27 Mar 2025 15:33) (70 - 94)  BP: 108/60 (27 Mar 2025 15:33) (108/60 - 164/81)   RR: 18 (27 Mar 2025 15:33) (18 - 19)  SpO2: 97% (27 Mar 2025 15:33) (97% - 98%)    Parameters below as of 27 Mar 2025 15:33  Patient On (Oxygen Delivery Method): room air        PHYSICAL EXAM:  GENERAL: NAD, well-groomed, well-developed  HEAD:  Atraumatic, Normocephalic  EYES: EOMI, PERRLA   NECK: Supple   NERVOUS SYSTEM:  Alert    CHEST/LUNG: Clear to auscultation bilaterally; No rales, rhonchi, wheezing, or rubs  HEART: Regular rate and rhythm; No murmurs, rubs, or gallops  ABDOMEN: Soft, Nontender, Nondistended; Bowel sounds present  EXTREMITIES: No clubbing, cyanosis, or edema    LABS:                        12.7   5.18  )-----------( 234      ( 26 Mar 2025 05:55 )             40.0     03-27    143  |  109[H]  |  16  ----------------------------<  69[L]  3.8   |  25  |  0.72    Ca    9.4      27 Mar 2025 06:20  Phos  3.1     03-27  Mg     2.1     03-27        Urinalysis Basic - ( 27 Mar 2025 06:20 )    Color: x / Appearance: x / SG: x / pH: x  Gluc: 69 mg/dL / Ketone: x  / Bili: x / Urobili: x   Blood: x / Protein: x / Nitrite: x   Leuk Esterase: x / RBC: x / WBC x   Sq Epi: x / Non Sq Epi: x / Bacteria: x

## 2025-03-27 NOTE — PROGRESS NOTE ADULT - SUBJECTIVE AND OBJECTIVE BOX
Patient seen and examined this am.     MEDICATIONS:    acetaminophen     Tablet .. 650 milliGRAM(s) Oral every 6 hours PRN  aluminum hydroxide/magnesium hydroxide/simethicone Suspension 30 milliLiter(s) Oral every 4 hours PRN  aspirin  chewable 81 milliGRAM(s) Oral daily  atorvastatin 40 milliGRAM(s) Oral at bedtime  donepezil 10 milliGRAM(s) Oral at bedtime  enoxaparin Injectable 40 milliGRAM(s) SubCutaneous every 24 hours  lactated ringers. 1000 milliLiter(s) IV Continuous <Continuous>  melatonin 3 milliGRAM(s) Oral at bedtime PRN  memantine 10 milliGRAM(s) Oral two times a day  ondansetron Injectable 4 milliGRAM(s) IV Push every 8 hours PRN  risperiDONE  Disintegrating Tablet 1 milliGRAM(s) Oral two times a day      LABS:                          12.7   5.18  )-----------( 234      ( 26 Mar 2025 05:55 )             40.0     03-27    143  |  109[H]  |  16  ----------------------------<  69[L]  3.8   |  25  |  0.72    Ca    9.4      27 Mar 2025 06:20  Phos  3.1     03-27  Mg     2.1     03-27      CAPILLARY BLOOD GLUCOSE          Urinalysis Basic - ( 27 Mar 2025 06:20 )    Color: x / Appearance: x / SG: x / pH: x  Gluc: 69 mg/dL / Ketone: x  / Bili: x / Urobili: x   Blood: x / Protein: x / Nitrite: x   Leuk Esterase: x / RBC: x / WBC x   Sq Epi: x / Non Sq Epi: x / Bacteria: x      I&O's Summary    Vital Signs Last 24 Hrs  T(C): 36.3 (27 Mar 2025 04:51), Max: 37.1 (26 Mar 2025 15:46)  T(F): 97.3 (27 Mar 2025 04:51), Max: 98.7 (26 Mar 2025 15:46)  HR: 92 (27 Mar 2025 04:51) (78 - 103)  BP: 164/81 (27 Mar 2025 04:51) (126/79 - 164/81)  BP(mean): --  RR: 19 (27 Mar 2025 04:51) (18 - 19)  SpO2: 98% (27 Mar 2025 04:51) (95% - 98%)    Parameters below as of 27 Mar 2025 04:51  Patient On (Oxygen Delivery Method): room air          On Neurological Examination:    Neuro: eyes opesn perseverates not following commands  CN: PERRL, EOMI, VFF normal gaze preference, no facial palsy,     Motor: CEDRICK seems to have possible LUE dysmetria    Sensory: Intact to LT and PP no extinction    Coordination: FTN intact b/l            GENERAL Exam:     Nontoxic , No Acute Distress   	  HEENT:  normocephalic, atraumatic  		  LUNGS:	Clear bilaterally  No Wheeze    	  HEART:	 Normal S1S2   No murmur RRR        	  GI/ ABDOMEN:  Soft  Non tender    EXTREMITIES:   No Edema  No Clubbing  No Cyanosis No Edema    MUSCULOSKELETAL: Normal Range of Motion  	   SKIN:      Normal   No Ecchymosis      RADIOLOGY  < from: CT Brain Stroke Protocol (03.23.25 @ 15:08) >  CTA HEAD:    Evaluation of the anterior circulation demonstrates scattered   atherosclerotic calcifications along the distal internal carotid arteries.    The proximal anterior, middle and posterior cerebral arteries are patent   bilaterally.    Evaluation of the posterior circulation demonstrates patent   vertebrobasilar system.    No evidence of vascular stenosis, occlusion, aneurysm or vascular  malformation in the [place].        IMPRESSION:    CT HEAD: No acute intracranial findings.    Findings discussed with Dr. Werner of the patient's clinical team on   3/23/2025 at 3:12 PM.    CT PERFUSION: Mismatch perfusion defect in the left cerebral hemisphere   suggestive of ischemia. Mismatch volume: 42 mL.    < end of copied text >        < from: CT Head No Cont (03.25.25 @ 17:10) >    IMPRESSION:  Degraded by motion.  No evidence of an acute intracranial hemorrhage, midline shift, or   hydrocephalus.  Atrophy with confluent white matter ischemic changes.  No significant interval change from prior exam.    < end of copied text >

## 2025-03-27 NOTE — BH CONSULTATION LIAISON PROGRESS NOTE - NSBHCONSULTRECOMMENDOTHER_PSY_A_CORE FT
3/25/25:  continue home meds of Aricept 10mg PO qhs and Namenda 10mg PO bid   - start risperdal M tab 1mg po bid (Pt is not antipsychotic naive; was on Seroquel for a few months) for delirium-induced agitation; may not open mouth / resistant to any PO intake as per nursing so may need IVP route at this time  - STAT haldol 3mg IVP with Ativan 2mg IVP for acute severe agitation/restlessness withy goal for Patient to be calm and comfortable enough so to be taken out of wrist restraints   -  can use haldol 3mg IVP with Ativan 2mg IVP q6hrs for 24 hours until agitation severity reduces than switch to PO   - QTc 444 msec (within normal limits); will repeat after 24 hrs   - proposed plan discussed with pt's wife as well as risks/benefit; wife gave verbal consent   - no capacity to make his medical decisions; Pt's wife is his decision-maker   3/26/25: Pt refusing all PO medications; STAT haldol 3mg IVP instead of oral agents; discussed with wife at bedside  - no PO intake of food or fluids, continue maintenance IVF    3/27/25: started to take PO medications this morning; continue to encourage PO intake, compliance  - once more cooperative, would call Swallow eval to be repeated   - reorient, encourage visitation by family, continue IVF 3/25/25:  continue home meds of Aricept 10mg PO qhs and Namenda 10mg PO bid   - start risperdal M tab 1mg po bid (Pt is not antipsychotic naive; was on Seroquel for a few months) for delirium-induced agitation; may not open mouth / resistant to any PO intake as per nursing so may need IVP route at this time  - STAT haldol 3mg IVP with Ativan 2mg IVP for acute severe agitation/restlessness withy goal for Patient to be calm and comfortable enough so to be taken out of wrist restraints   -  can use haldol 3mg IVP with Ativan 2mg IVP q6hrs for 24 hours until agitation severity reduces than switch to PO   - QTc 444 msec (within normal limits); will repeat after 24 hrs   - proposed plan discussed with pt's wife as well as risks/benefit; wife gave verbal consent   - no capacity to make his medical decisions; Pt's wife is his decision-maker   3/26/25: Pt refusing all PO medications; STAT haldol 3mg IVP instead of oral agents; discussed with wife at bedside  - no PO intake of food or fluids, continue maintenance IVF    3/27/25: started to take PO medications this morning; continue to encourage PO intake, compliance  - once more cooperative, would call Swallow eval to be repeated   - reorient, encourage visitation by family, continue IVF  - discontinue wrist restraints once Patient is calm / no longer agitated. It should only be used short term and as last resort

## 2025-03-28 LAB
ANION GAP SERPL CALC-SCNC: 8 MMOL/L — SIGNIFICANT CHANGE UP (ref 5–17)
BUN SERPL-MCNC: 11 MG/DL — SIGNIFICANT CHANGE UP (ref 7–23)
CALCIUM SERPL-MCNC: 9.4 MG/DL — SIGNIFICANT CHANGE UP (ref 8.5–10.1)
CHLORIDE SERPL-SCNC: 104 MMOL/L — SIGNIFICANT CHANGE UP (ref 96–108)
CO2 SERPL-SCNC: 26 MMOL/L — SIGNIFICANT CHANGE UP (ref 22–31)
CREAT SERPL-MCNC: 0.64 MG/DL — SIGNIFICANT CHANGE UP (ref 0.5–1.3)
EGFR: 96 ML/MIN/1.73M2 — SIGNIFICANT CHANGE UP
EGFR: 96 ML/MIN/1.73M2 — SIGNIFICANT CHANGE UP
GLUCOSE SERPL-MCNC: 82 MG/DL — SIGNIFICANT CHANGE UP (ref 70–99)
MAGNESIUM SERPL-MCNC: 2.1 MG/DL — SIGNIFICANT CHANGE UP (ref 1.6–2.6)
PHOSPHATE SERPL-MCNC: 2.8 MG/DL — SIGNIFICANT CHANGE UP (ref 2.5–4.5)
POTASSIUM SERPL-MCNC: 3.9 MMOL/L — SIGNIFICANT CHANGE UP (ref 3.5–5.3)
POTASSIUM SERPL-SCNC: 3.9 MMOL/L — SIGNIFICANT CHANGE UP (ref 3.5–5.3)
SODIUM SERPL-SCNC: 138 MMOL/L — SIGNIFICANT CHANGE UP (ref 135–145)

## 2025-03-28 PROCEDURE — 99231 SBSQ HOSP IP/OBS SF/LOW 25: CPT

## 2025-03-28 PROCEDURE — 99232 SBSQ HOSP IP/OBS MODERATE 35: CPT

## 2025-03-28 RX ORDER — RISPERIDONE 4 MG
1 TABLET ORAL
Refills: 0 | DISCHARGE

## 2025-03-28 RX ADMIN — ENOXAPARIN SODIUM 40 MILLIGRAM(S): 100 INJECTION SUBCUTANEOUS at 06:31

## 2025-03-28 RX ADMIN — MEMANTINE HYDROCHLORIDE 10 MILLIGRAM(S): 21 CAPSULE, EXTENDED RELEASE ORAL at 06:34

## 2025-03-28 RX ADMIN — Medication 81 MILLIGRAM(S): at 11:21

## 2025-03-28 RX ADMIN — Medication 1 MILLIGRAM(S): at 06:32

## 2025-03-28 RX ADMIN — MEMANTINE HYDROCHLORIDE 10 MILLIGRAM(S): 21 CAPSULE, EXTENDED RELEASE ORAL at 17:10

## 2025-03-28 RX ADMIN — ATORVASTATIN CALCIUM 40 MILLIGRAM(S): 80 TABLET, FILM COATED ORAL at 21:35

## 2025-03-28 RX ADMIN — Medication 1 MILLIGRAM(S): at 17:10

## 2025-03-28 RX ADMIN — DONEPEZIL HYDROCHLORIDE 10 MILLIGRAM(S): 5 TABLET ORAL at 21:32

## 2025-03-28 NOTE — PROGRESS NOTE ADULT - SUBJECTIVE AND OBJECTIVE BOX
79 year old male with history of HLD and Alzheimer's dementia who presented to the ED on 3/23/25 for complaints of inability to move left hand and admitted for CVA. He is lying in bed in NAD.      MEDICATIONS  (STANDING):  aspirin  chewable 81 milliGRAM(s) Oral daily  atorvastatin 40 milliGRAM(s) Oral at bedtime  donepezil 10 milliGRAM(s) Oral at bedtime  enoxaparin Injectable 40 milliGRAM(s) SubCutaneous every 24 hours  lactated ringers. 1000 milliLiter(s) (75 mL/Hr) IV Continuous <Continuous>  memantine 10 milliGRAM(s) Oral two times a day  risperiDONE  Disintegrating Tablet 1 milliGRAM(s) Oral two times a day    MEDICATIONS  (PRN):  acetaminophen     Tablet .. 650 milliGRAM(s) Oral every 6 hours PRN Temp greater or equal to 38C (100.4F), Mild Pain (1 - 3)  aluminum hydroxide/magnesium hydroxide/simethicone Suspension 30 milliLiter(s) Oral every 4 hours PRN Dyspepsia  melatonin 3 milliGRAM(s) Oral at bedtime PRN Insomnia  ondansetron Injectable 4 milliGRAM(s) IV Push every 8 hours PRN Nausea and/or Vomiting      Allergies    ACE inhibitors (Angioedema)    Intolerances        Vital Signs Last 24 Hrs  T(C): 36.4 (28 Mar 2025 17:08), Max: 36.5 (27 Mar 2025 23:39)  T(F): 97.5 (28 Mar 2025 17:08), Max: 97.7 (27 Mar 2025 23:39)  HR: 92 (28 Mar 2025 17:08) (68 - 92)  BP: 150/81 (28 Mar 2025 17:08) (146/88 - 167/94)  BP(mean): --  RR: 18 (28 Mar 2025 17:08) (16 - 18)  SpO2: 96% (28 Mar 2025 17:08) (96% - 99%)    Parameters below as of 28 Mar 2025 11:15  Patient On (Oxygen Delivery Method): room air        PHYSICAL EXAM:  GENERAL: NAD, well-groomed, well-developed  HEAD:  Atraumatic, Normocephalic  EYES: EOMI, PERRLA   NECK: Supple   NERVOUS SYSTEM:  Alert    CHEST/LUNG: Clear to auscultation bilaterally; No rales, rhonchi, wheezing, or rubs  HEART: Regular rate and rhythm; No murmurs, rubs, or gallops  ABDOMEN: Soft, Nontender, Nondistended; Bowel sounds present  EXTREMITIES: No clubbing, cyanosis, or edema    LABS:    03-28    138  |  104  |  11  ----------------------------<  82  3.9   |  26  |  0.64    Ca    9.4      28 Mar 2025 06:20  Phos  2.8     03-28  Mg     2.1     03-28        Urinalysis Basic - ( 28 Mar 2025 06:20 )    Color: x / Appearance: x / SG: x / pH: x  Gluc: 82 mg/dL / Ketone: x  / Bili: x / Urobili: x   Blood: x / Protein: x / Nitrite: x   Leuk Esterase: x / RBC: x / WBC x   Sq Epi: x / Non Sq Epi: x / Bacteria: x      CAPILLARY BLOOD GLUCOSE          RADIOLOGY & ADDITIONAL TESTS:    03-27-25 @ 07:01  -  03-28-25 @ 07:00  --------------------------------------------------------  IN:  Total IN: 0 mL    OUT:    Voided (mL): 425 mL  Total OUT: 425 mL    Total NET: -425 mL      03-28-25 @ 07:01  -  03-28-25 @ 17:56  --------------------------------------------------------  IN:    Oral Fluid: 240 mL  Total IN: 240 mL    OUT:    Voided (mL): 200 mL  Total OUT: 200 mL    Total NET: 40 mL

## 2025-03-28 NOTE — BH CONSULTATION LIAISON PROGRESS NOTE - NSBHCHARTREVIEWVS_PSY_A_CORE FT
Vital Signs Last 24 Hrs  T(C): 36.7 (26 Mar 2025 10:43), Max: 37.1 (25 Mar 2025 23:38)  T(F): 98 (26 Mar 2025 10:43), Max: 98.7 (25 Mar 2025 23:38)  HR: 78 (26 Mar 2025 10:43) (62 - 101)  BP: 126/79 (26 Mar 2025 10:43) (126/79 - 158/95)  BP(mean): --  RR: 18 (26 Mar 2025 10:43) (18 - 18)  SpO2: 97% (26 Mar 2025 10:43) (95% - 99%)    Parameters below as of 26 Mar 2025 04:45  Patient On (Oxygen Delivery Method): room air    
Vital Signs Last 24 Hrs  T(C): 36.4 (28 Mar 2025 11:15), Max: 37.2 (27 Mar 2025 15:33)  T(F): 97.6 (28 Mar 2025 11:15), Max: 98.9 (27 Mar 2025 15:33)  HR: 80 (28 Mar 2025 11:15) (68 - 93)  BP: 146/88 (28 Mar 2025 11:15) (108/60 - 167/94)  BP(mean): --  RR: 16 (28 Mar 2025 11:15) (16 - 18)  SpO2: 99% (28 Mar 2025 11:15) (97% - 99%)    Parameters below as of 28 Mar 2025 11:15  Patient On (Oxygen Delivery Method): room air    
Vital Signs Last 24 Hrs  T(C): 36.2 (27 Mar 2025 10:46), Max: 37.1 (26 Mar 2025 15:46)  T(F): 97.2 (27 Mar 2025 10:46), Max: 98.7 (26 Mar 2025 15:46)  HR: 70 (27 Mar 2025 10:46) (70 - 103)  BP: 147/84 (27 Mar 2025 10:46) (136/97 - 164/81)  BP(mean): --  RR: 18 (27 Mar 2025 10:46) (18 - 19)  SpO2: 97% (27 Mar 2025 10:46) (95% - 98%)    Parameters below as of 27 Mar 2025 10:46  Patient On (Oxygen Delivery Method): room air

## 2025-03-28 NOTE — BH CONSULTATION LIAISON PROGRESS NOTE - NSBHCHARTREVIEWLAB_PSY_A_CORE FT
03-26    143  |  108  |  22  ----------------------------<  68[L]  3.7   |  28  |  0.89    Ca    9.2      26 Mar 2025 05:55  Phos  3.6     03-26  Mg     2.3     03-26    
03-27    143  |  109[H]  |  16  ----------------------------<  69[L]  3.8   |  25  |  0.72    Ca    9.4      27 Mar 2025 06:20  Phos  3.1     03-27  Mg     2.1     03-27      
03-28    138  |  104  |  11  ----------------------------<  82  3.9   |  26  |  0.64    Ca    9.4      28 Mar 2025 06:20  Phos  2.8     03-28  Mg     2.1     03-28

## 2025-03-28 NOTE — BH CONSULTATION LIAISON PROGRESS NOTE - NSBHCONSULTRECOMMENDOTHER_PSY_A_CORE FT
3/25/25:  continue home meds of Aricept 10mg PO qhs and Namenda 10mg PO bid   - start risperdal M tab 1mg po bid (Pt is not antipsychotic naive; was on Seroquel for a few months) for delirium-induced agitation; may not open mouth / resistant to any PO intake as per nursing so may need IVP route at this time  - STAT haldol 3mg IVP with Ativan 2mg IVP for acute severe agitation/restlessness withy goal for Patient to be calm and comfortable enough so to be taken out of wrist restraints   -  can use haldol 3mg IVP with Ativan 2mg IVP q6hrs for 24 hours until agitation severity reduces than switch to PO   - QTc 444 msec (within normal limits); will repeat after 24 hrs   - proposed plan discussed with pt's wife as well as risks/benefit; wife gave verbal consent   - no capacity to make his medical decisions; Pt's wife is his decision-maker   3/26/25: Pt refusing all PO medications; STAT haldol 3mg IVP instead of oral agents; discussed with wife at bedside  - no PO intake of food or fluids, continue maintenance IVF    3/27/25: started to take PO medications this morning; continue to encourage PO intake, compliance  - once more cooperative, would call Swallow eval to be repeated   - reorient, encourage visitation by family, continue IVF  - discontinue wrist restraints once Patient is calm / no longer agitated. It should only be used short term and as last resort   3/28/25: continue risperdal M tab 1mg PO bid (Pt taking qd at this time); would continue until discharge   - does not need risperdal prescription for home use at this time   - [facility] signing off

## 2025-03-28 NOTE — SWALLOW BEDSIDE ASSESSMENT ADULT - SWALLOW EVAL: RECOMMENDED FEEDING/EATING TECHNIQUES
allow for swallow between intakes/alternate food with liquid/check mouth frequently for oral residue/pocketing/maintain upright posture during/after eating for 30 mins/oral hygiene/small sips/bites Feed when alert and awake./allow for swallow between intakes/alternate food with liquid/check mouth frequently for oral residue/pocketing/maintain upright posture during/after eating for 30 mins/oral hygiene/small sips/bites

## 2025-03-28 NOTE — BH CONSULTATION LIAISON PROGRESS NOTE - NSBHFUPINTERVALHXFT_PSY_A_CORE
Patient took PO medications this morning but refused bedtime medications last night. Patient was able to tolerate a [place] re-evaluation today - soft & bite sized with thin liquids was recommended.  Patient did not receive any subsequent IM/IVP PRNs for agitation since last seen. Patient is maintaining clinical improvement as compared to his initial assessment.  Awake, alert, engages and able to answer basic questions.

## 2025-03-28 NOTE — SWALLOW BEDSIDE ASSESSMENT ADULT - SLP PRECAUTIONS/LIMITATIONS: VISION
Pt kept eye closed for majority of the evaluation, did not locate to SLP or PCA. Pt kept eyes closed for majority of the evaluation, did not locate to SLP or PCA.

## 2025-03-28 NOTE — SWALLOW BEDSIDE ASSESSMENT ADULT - H & P REVIEW
"Jordy Hester is an 79 year old male with PMHx of HLD and Alzheimer's dementia who presented to the ED on 3/23/25 for complaints of inability to move left hand."/yes

## 2025-03-28 NOTE — SWALLOW BEDSIDE ASSESSMENT ADULT - SWALLOW EVAL: DIAGNOSIS
pt presented with oropharyngeal dysphagia for puree, minced & moist, easy to chew, moderate thick liquid, mild thick liquid and thin liquid. oral phase marked by adequate oral opening acceptance of po trial, adequate labial seal, adequate oral containment, slowed but adequate bolus manipulation/transport posterior with adequate oral clearance. +initiation of pharyngeal swallow trigger with laryngeal excursion to palpation.  No overt s/s of laryngeal penetration/aspiration. pt presented with oropharyngeal dysphagia for puree, minced & moist, easy to chew, moderate thick liquid, mild thick liquid and thin liquid. oral phase marked by adequate oral opening acceptance of po trial, adequate labial seal, adequate oral containment, slowed but functional bolus manipulation/transport posterior with good oral clearance. +initiation of pharyngeal swallow trigger with laryngeal excursion to palpation.  No overt s/s of laryngeal penetration/aspiration.

## 2025-03-28 NOTE — BH CONSULTATION LIAISON PROGRESS NOTE - NSBHCONSULTSUBST_PSY_A_CORE
Blood pressure check    Previous blood pressure: 100/70    Interval history: At last BP check, 03/15/2024  recommended to patient He could stop the Lisinopril if he'd like. BP check in 2 wks.Patient states on 03/15/2024 he would like to stay on the lisinopril and if blood pressure is still low in 2 weeks at next BP check, he will stop then.     Blood pressure today: 112/66  
Can stop lisinopril if he'd like    BP check in 2 wks  
Left detailed message, per patient request when in office. Informed of 's recommendations and to call back to [facility] blood pressure check in 2 weeks.   
no

## 2025-03-28 NOTE — SWALLOW BEDSIDE ASSESSMENT ADULT - SLP PERTINENT HISTORY OF CURRENT PROBLEM
Pt admitted to telemetry to r/o TIA vs CVA Pt admitted to telemetry to r/o TIA vs CVA, Alzheimer's dementia

## 2025-03-28 NOTE — SWALLOW BEDSIDE ASSESSMENT ADULT - SWALLOW EVAL: ORAL MUSCULATURE
informal observation/unable to assess due to poor participation/comprehension informal observation/generally intact/unable to assess due to poor participation/comprehension

## 2025-03-28 NOTE — SWALLOW BEDSIDE ASSESSMENT ADULT - ASR SWALLOW ASPIRATION MONITOR
change of breathing pattern/oral hygiene/cough/gurgly voice/pneumonia/throat clearing/upper respiratory infection

## 2025-03-28 NOTE — BH CONSULTATION LIAISON PROGRESS NOTE - CURRENT MEDICATION
MEDICATIONS  (STANDING):  aspirin  chewable 81 milliGRAM(s) Oral daily  atorvastatin 40 milliGRAM(s) Oral at bedtime  donepezil 10 milliGRAM(s) Oral at bedtime  enoxaparin Injectable 40 milliGRAM(s) SubCutaneous every 24 hours  lactated ringers. 1000 milliLiter(s) (75 mL/Hr) IV Continuous <Continuous>  memantine 10 milliGRAM(s) Oral two times a day  risperiDONE  Disintegrating Tablet 1 milliGRAM(s) Oral two times a day    MEDICATIONS  (PRN):  acetaminophen     Tablet .. 650 milliGRAM(s) Oral every 6 hours PRN Temp greater or equal to 38C (100.4F), Mild Pain (1 - 3)  aluminum hydroxide/magnesium hydroxide/simethicone Suspension 30 milliLiter(s) Oral every 4 hours PRN Dyspepsia  melatonin 3 milliGRAM(s) Oral at bedtime PRN Insomnia  ondansetron Injectable 4 milliGRAM(s) IV Push every 8 hours PRN Nausea and/or Vomiting  

## 2025-03-28 NOTE — BH CONSULTATION LIAISON PROGRESS NOTE - NSBHCHARTREVIEWINVESTIGATE_PSY_A_CORE FT
TTE Echo Complete w/o Contrast w/ Doppler (03.26.25) ejection fraction visually estimated at 60 to 65 %. There is mild (grade 1) left ventricular diastolic dysfunction.          
CT Head No Cont (03.25.25 @ 17:10)  Atrophy with confluent white matter ischemic changes. No significant interval change from prior exam.        
TTE Echo Complete w/o Contrast w/ Doppler (03.26.25) ejection fraction visually estimated at 60 to 65 %. There is mild (grade 1) left ventricular diastolic dysfunction.

## 2025-03-28 NOTE — PROGRESS NOTE ADULT - ASSESSMENT
79 year old male with history of HLD and Alzheimer's dementia who presented to the ED on 3/23/25 for complaints of inability to move left hand and admitted for CVA.    CVA  - wife last saw patient at baseline self around 11:30PM night prior to admission - wife states patient was speaking in 1-2 words when he usually speaks in complete sentences and she noticed he was not moving his left hand around 11AM  - NIHSS 11 as per ER physician documentation, not TNK candidate given outside the window for thrombolytic therapy  - CT head without acute intracranial findings  - CTA brain without vascular stenosis, occlusion, aneurysm or vascular malformation  - CTA neck without vessel narrowing or occlusion in the neck  - CT brain perfusion with mismatch perfusion defect in the L. cerebral hemisphere suggestive of ischemia, mismatch volume: 42 mL, and matched perfusion defect in the R. cerebellar hemisphere   - Unable to get SLP eval due to agitation, spoke with daughter who states patient has no diet restrictions she is ok with resuming puree diet without SLP eval given he is too agitated for full eval   - A1c 5.9,  will increase statin   - Echocardiogram showed EF 60-65% w/ mild (grade 1) left ventricular diastolic dysfunction, trace tricuspid regurgitation and agitated saline injection was negative for intracardiac shunt  - PT/OT consulted  - Telemetry to monitor for arrhythmias  - Neurology consulted, recommended MRI brain but patient was too agitated & repeat CTH from 3/25 showed no significant interval change from prior exam - as per neurology, can not entirely exclude small infarcts  - c/w ASA & atorvastatin   - EEG as outpatient as patient is not cooperative     Alzheimer's dementia  - c/w Donepezil & memantine    - s/p IV Zyprexa 3/24 due to agitation   - Psych note read and appreciated - patient started on Risperidone & may need haldol 3mg IVP with Ativan 2mg IVP q6hrs for 24 hours until agitation severity reduces than switch to PO     Prediabetes   - A1c 5.9 on admission   - POC glucose     HLD  - c/w atorvastatin     Prophylaxis:  DVT: Lovenox   GI: PO diet     NOK: Pt's better and medically stable for discharge as he is now eating. Called daughter Dr. Tanya Hester (anesthesiologist, [phone]) and wife, Dallin Hester ([phone]) but they did not .

## 2025-03-28 NOTE — BH CONSULTATION LIAISON PROGRESS NOTE - NSICDXBHSECONDARYDX_PSY_ALL_CORE
Suspected cerebrovascular accident (CVA)   R09.89  Delirium   R41.0  

## 2025-03-28 NOTE — SWALLOW BEDSIDE ASSESSMENT ADULT - ADDITIONAL RECOMMENDATIONS
Short term goals:   1. pt will tolerate recommended textures without s/sx of aspiration.   2. pt/family education regarding oral care /aspiration precautions and will demonstrate understanding and carryover.

## 2025-03-29 PROCEDURE — 99232 SBSQ HOSP IP/OBS MODERATE 35: CPT

## 2025-03-29 PROCEDURE — 95816 EEG AWAKE AND DROWSY: CPT | Mod: 26

## 2025-03-29 RX ORDER — HALOPERIDOL 10 MG/1
3 TABLET ORAL ONCE
Refills: 0 | Status: DISCONTINUED | OUTPATIENT
Start: 2025-03-29 | End: 2025-03-29

## 2025-03-29 RX ORDER — HALOPERIDOL 10 MG/1
3 TABLET ORAL ONCE
Refills: 0 | Status: COMPLETED | OUTPATIENT
Start: 2025-03-29 | End: 2025-03-29

## 2025-03-29 RX ADMIN — ENOXAPARIN SODIUM 40 MILLIGRAM(S): 100 INJECTION SUBCUTANEOUS at 06:24

## 2025-03-29 RX ADMIN — HALOPERIDOL 3 MILLIGRAM(S): 10 TABLET ORAL at 02:30

## 2025-03-29 NOTE — PROGRESS NOTE ADULT - ASSESSMENT
79 year old male with history of HLD and Alzheimer's dementia who presented to the ED on 3/23/25 for complaints of inability to move left hand and admitted for CVA.    CVA  - wife last saw patient at baseline self around 11:30PM night prior to admission - wife states patient was speaking in 1-2 words when he usually speaks in complete sentences and she noticed he was not moving his left hand around 11AM  - NIHSS 11 as per ER physician documentation, not TNK candidate given outside the window for thrombolytic therapy  - CT head without acute intracranial findings  - CTA brain without vascular stenosis, occlusion, aneurysm or vascular malformation  - CTA neck without vessel narrowing or occlusion in the neck  - CT brain perfusion with mismatch perfusion defect in the L. cerebral hemisphere suggestive of ischemia, mismatch volume: 42 mL, and matched perfusion defect in the R. cerebellar hemisphere   - Unable to get SLP eval due to agitation, spoke with daughter who states patient has no diet restrictions she is ok with resuming puree diet without SLP eval given he is too agitated for full eval   - A1c 5.9,  will increase statin   - Echocardiogram showed EF 60-65% w/ mild (grade 1) left ventricular diastolic dysfunction, trace tricuspid regurgitation and agitated saline injection was negative for intracardiac shunt  - PT/OT consulted  - Telemetry to monitor for arrhythmias  - Neurology consulted, recommended MRI brain but patient was too agitated & repeat CTH from 3/25 showed no significant interval change from prior exam - as per neurology, can not entirely exclude small infarcts  - c/w ASA & atorvastatin   - EEG as outpatient as patient is not cooperative     Alzheimer's dementia  - c/w Donepezil & memantine    - s/p IV Zyprexa 3/24 due to agitation   - Psych note read and appreciated - patient started on Risperidone & may need haldol 3mg IVP with Ativan 2mg IVP q6hrs for 24 hours until agitation severity reduces than switch to PO     Prediabetes   - A1c 5.9 on admission   - POC glucose     HLD  - c/w atorvastatin     Prophylaxis:  DVT: Lovenox   GI: PO diet     NOK: Pt's better and medically stable for discharge but was sedated overnight and is still very lethargic. Family is refusing to take the patient home until he is more awake. Will try to discharge in AM.   NOK: daughter Dr. Tanya Hester (anesthesiologist, [phone]) and wife, Dallin Hester ([phone]) but they did not .   79 year old male with history of HLD and Alzheimer's dementia who presented to the ED on 3/23/25 for complaints of inability to move left hand and admitted for CVA.    CVA  - wife last saw patient at baseline self around 11:30PM night prior to admission - wife states patient was speaking in 1-2 words when he usually speaks in complete sentences and she noticed he was not moving his left hand around 11AM  - NIHSS 11 as per ER physician documentation, not TNK candidate given outside the window for thrombolytic therapy  - CT head without acute intracranial findings  - CTA brain without vascular stenosis, occlusion, aneurysm or vascular malformation  - CTA neck without vessel narrowing or occlusion in the neck  - CT brain perfusion with mismatch perfusion defect in the L. cerebral hemisphere suggestive of ischemia, mismatch volume: 42 mL, and matched perfusion defect in the R. cerebellar hemisphere   - Unable to get SLP eval due to agitation, spoke with daughter who states patient has no diet restrictions she is ok with resuming puree diet without SLP eval given he is too agitated for full eval   - A1c 5.9,  will increase statin   - Echocardiogram showed EF 60-65% w/ mild (grade 1) left ventricular diastolic dysfunction, trace tricuspid regurgitation and agitated saline injection was negative for intracardiac shunt  - PT/OT consulted  - Telemetry to monitor for arrhythmias  - Neurology consulted, recommended MRI brain but patient was too agitated & repeat CTH from 3/25 showed no significant interval change from prior exam - as per neurology, can not entirely exclude small infarcts  - c/w ASA & atorvastatin   - EEG showed risk of focal onset seizures from the right temporal region w/ mild diffuse/multi-focal cerebral dysfunction - will ask neurology to follow up    Alzheimer's dementia  - c/w Donepezil & memantine    - s/p IV Zyprexa 3/24 due to agitation   - Psych note read and appreciated - patient started on Risperidone & may need haldol 3mg IVP with Ativan 2mg IVP q6hrs for 24 hours until agitation severity reduces than switch to PO     Prediabetes   - A1c 5.9 on admission   - POC glucose     HLD  - c/w atorvastatin     Prophylaxis:  DVT: Lovenox   GI: PO diet     NOK: Pt's better and medically stable for discharge but was sedated overnight and is still very lethargic. Family is refusing to take the patient home until he is more awake. Will try to discharge in AM.   NOK: daughter Dr. Tanya Hester (anesthesiologist, [phone]) and wife, Dallin Hester ([phone]) but they did not .

## 2025-03-29 NOTE — CHART NOTE - NSCHARTNOTEFT_GEN_A_CORE
Medicine NP note:    Called by RN for a pt exhibiting combative and anxious behavior. Upon arrival, security was already at the bedside. As per RN, Pt stood up and them voluntarily lowered himself into a seated position on the floor, as witnessed by the RN. Pt did not sustain any injuries and did not hit his head. Haldol 3mg IV was ordered, and constant observation was initiated. Medicine NP event note:      Called by RN for a pt exhibiting combative and anxious behavior. Upon arrival, security was already at the bedside. As per RN, Pt stood up and then voluntarily lowered himself into a sitting position on the floor, as witnessed by the RN. Pt did not sustain any injuries and did not hit his head. Haldol 3mg IV was ordered, and constant observation was initiated. Case discussed with Dr. Tanner, who agreed with the plan above. /88, HR 85, Temp 98.2 O2 sat 96    JORDY VILLALOBOS  [id number]  HPI:  Jordy Villalobos is an 79 year old male with PMHx of HLD and Alzheimer's dementia who presented to the ED on 3/23/25 for complaints of inability to move left hand.    History primarily obtained from wife, Kacy Gonzalez ([phone]) as patient is unable to provide history due to dementia. As per wife, they stayed up late to watch a movie last night and patient fell asleep around 11:30PM. Wife sleeps in a different room than him but noticed patient was not up yet past 10AM this morning which is atypical for him. She did not think much of it and just let him sleep. Then when patient woke up around 11AM, wife noticed he was wearing different clothes than when he went to sleep. Upon wife questioning him when he changed his clothes, patient was not responding appropriately. Wife states he was speaking in 1-2 words when he usually speaks in complete sentences. Then she noticed his left hand was not moving. Brought to the ER for further evaluation. Baseline functional status is ambulates unassisted and independent with most ADLs. Requires assistance with showering. Lives at home with wife, daughter, and son-in-law.    In the ED, VSS except BP as elevated as 175/95. Labs grossly unremarkable. U/A unremarkable. CT head without acute intracranial findings. CTA brain without vascular stenosis, occlusion, aneurysm or vascular malformation. CTA neck without vessel narrowing or occlusion in the neck. CT brain perfusion with mismatch perfusion defect in the left cerebral hemisphere suggestive of ischemia. Mismatch volume: 42 mL. Matched perfusion defect in the right cerebellar hemisphere. Received lorazepam 1 mg IV. ER physician contacted neurology who will see patient in AM. (23 Mar 2025 20:06)      24 HOUR EVENTS:      ICU Vital Signs Last 24 Hrs  T(C): 36.8 (29 Mar 2025 02:44), Max: 36.8 (29 Mar 2025 02:44)  T(F): 98.2 (29 Mar 2025 02:44), Max: 98.2 (29 Mar 2025 02:44)  HR: 85 (29 Mar 2025 02:44) (69 - 92)  BP: 146/88 (29 Mar 2025 02:44) (146/88 - 159/82)  BP(mean): --  ABP: --  ABP(mean): --  RR: 18 (29 Mar 2025 02:44) (16 - 18)  SpO2: 96% (29 Mar 2025 02:44) (96% - 99%)    O2 Parameters below as of 29 Mar 2025 02:44  Patient On (Oxygen Delivery Method): room air          I&O's Summary    27 Mar 2025 07:01  -  28 Mar 2025 07:00  --------------------------------------------------------  IN: 0 mL / OUT: 425 mL / NET: -425 mL    28 Mar 2025 07:01  -  29 Mar 2025 02:52  --------------------------------------------------------  IN: 240 mL / OUT: 200 mL / NET: 40 mL        CAPILLARY BLOOD GLUCOSE          ROS:   Negative for 12 body systems unless otherwise specified in HPI    Physical Exam  General: awake, anxious, combative, restless  HEENT: atraumatic, PERRL, sclera non-icteric, moist mucous membranes, nares patent  Chest/Lungs: CTAB, non labored breathing  Heart: RRR  Abd: Soft, nontender nondistended, BS present. No rebound tenderness or guarding  Extremities: Normal pulses,  No edema, normal capillary refill, ROM+  Skin: warm, dry, appropriate for ethnicity  Neuro:A&OX1, confused,     MEDICATIONS:  MEDICATIONS  (STANDING):  aspirin  chewable 81 milliGRAM(s) Oral daily  atorvastatin 40 milliGRAM(s) Oral at bedtime  donepezil 10 milliGRAM(s) Oral at bedtime  enoxaparin Injectable 40 milliGRAM(s) SubCutaneous every 24 hours  memantine 10 milliGRAM(s) Oral two times a day  risperiDONE  Disintegrating Tablet 1 milliGRAM(s) Oral two times a day    MEDICATIONS  (PRN):  acetaminophen     Tablet .. 650 milliGRAM(s) Oral every 6 hours PRN Temp greater or equal to 38C (100.4F), Mild Pain (1 - 3)  aluminum hydroxide/magnesium hydroxide/simethicone Suspension 30 milliLiter(s) Oral every 4 hours PRN Dyspepsia  melatonin 3 milliGRAM(s) Oral at bedtime PRN Insomnia  ondansetron Injectable 4 milliGRAM(s) IV Push every 8 hours PRN Nausea and/or Vomiting       LABS/Micro/Rad/Cardio    03-28    138  |  104  |  11  ----------------------------<  82  3.9   |  26  |  0.64    Ca    9.4      28 Mar 2025 06:20  Phos  2.8     03-28  Mg     2.1     03-28          Urinalysis Basic - ( 28 Mar 2025 06:20 )    Color: x / Appearance: x / SG: x / pH: x  Gluc: 82 mg/dL / Ketone: x  / Bili: x / Urobili: x   Blood: x / Protein: x / Nitrite: x   Leuk Esterase: x / RBC: x / WBC x   Sq Epi: x / Non Sq Epi: x / Bacteria: x          A/P    1. Haldol 3mg IVP- Pt is more calmer and resting in bed.   2. Constant observation 1:1  3. Supportive care  4. Continue present care/treatment  5. Instruct RN to notify ACP/MD if condition worsens or changes      Case Discussed with Dr Tanner.      40 mins assessing presenting problems of acute illness. Medical decision making including Initiating plan of care, reviewing data, reviewing radiology, discussing with multidisciplinary team, non inclusive of procedures, discussing goals of care with patient/family.

## 2025-03-29 NOTE — PATIENT PROFILE ADULT - FALL HARM RISK - HARM RISK INTERVENTIONS

## 2025-03-29 NOTE — PROGRESS NOTE ADULT - SUBJECTIVE AND OBJECTIVE BOX
79 year old male with history of HLD and Alzheimer's dementia who presented to the ED on 3/23/25 for complaints of inability to move left hand and admitted for CVA. He is lying in bed in NAD.     MEDICATIONS  (STANDING):  aspirin  chewable 81 milliGRAM(s) Oral daily  atorvastatin 40 milliGRAM(s) Oral at bedtime  donepezil 10 milliGRAM(s) Oral at bedtime  enoxaparin Injectable 40 milliGRAM(s) SubCutaneous every 24 hours  memantine 10 milliGRAM(s) Oral two times a day  risperiDONE  Disintegrating Tablet 1 milliGRAM(s) Oral two times a day    MEDICATIONS  (PRN):  acetaminophen     Tablet .. 650 milliGRAM(s) Oral every 6 hours PRN Temp greater or equal to 38C (100.4F), Mild Pain (1 - 3)  aluminum hydroxide/magnesium hydroxide/simethicone Suspension 30 milliLiter(s) Oral every 4 hours PRN Dyspepsia  melatonin 3 milliGRAM(s) Oral at bedtime PRN Insomnia  ondansetron Injectable 4 milliGRAM(s) IV Push every 8 hours PRN Nausea and/or Vomiting      Allergies    ACE inhibitors (Angioedema)    Intolerances        Vital Signs Last 24 Hrs  T(C): 37 (29 Mar 2025 16:53), Max: 37 (29 Mar 2025 16:53)  T(F): 98.6 (29 Mar 2025 16:53), Max: 98.6 (29 Mar 2025 16:53)  HR: 88 (29 Mar 2025 16:53) (56 - 88)  BP: 130/80 (29 Mar 2025 16:53) (130/80 - 155/79)  BP(mean): --  RR: 17 (29 Mar 2025 16:53) (16 - 18)  SpO2: 93% (29 Mar 2025 16:53) (93% - 96%)    Parameters below as of 29 Mar 2025 16:53  Patient On (Oxygen Delivery Method): room air        PHYSICAL EXAM:  GENERAL: NAD, well-groomed, well-developed  HEAD:  Atraumatic, Normocephalic  EYES: EOMI, PERRLA   NECK: Supple   NERVOUS SYSTEM:  Alert    CHEST/LUNG: Clear to auscultation bilaterally; No rales, rhonchi, wheezing, or rubs  HEART: Regular rate and rhythm; No murmurs, rubs, or gallops  ABDOMEN: Soft, Nontender, Nondistended; Bowel sounds present  EXTREMITIES: No clubbing, cyanosis, or edema      LABS:    03-28    138  |  104  |  11  ----------------------------<  82  3.9   |  26  |  0.64    Ca    9.4      28 Mar 2025 06:20  Phos  2.8     03-28  Mg     2.1     03-28        Urinalysis Basic - ( 28 Mar 2025 06:20 )    Color: x / Appearance: x / SG: x / pH: x  Gluc: 82 mg/dL / Ketone: x  / Bili: x / Urobili: x   Blood: x / Protein: x / Nitrite: x   Leuk Esterase: x / RBC: x / WBC x   Sq Epi: x / Non Sq Epi: x / Bacteria: x         RADIOLOGY & ADDITIONAL TESTS:    03-28-25 @ 07:01  -  03-29-25 @ 07:00  --------------------------------------------------------  IN:    Oral Fluid: 240 mL  Total IN: 240 mL    OUT:    Voided (mL): 200 mL  Total OUT: 200 mL    Total NET: 40 mL       Left arm;

## 2025-03-30 VITALS
RESPIRATION RATE: 18 BRPM | TEMPERATURE: 98 F | SYSTOLIC BLOOD PRESSURE: 135 MMHG | DIASTOLIC BLOOD PRESSURE: 92 MMHG | HEART RATE: 96 BPM | OXYGEN SATURATION: 95 %

## 2025-03-30 PROCEDURE — 99239 HOSP IP/OBS DSCHRG MGMT >30: CPT

## 2025-03-30 RX ORDER — LACOSAMIDE 150 MG/1
1 TABLET, FILM COATED ORAL
Qty: 60 | Refills: 0
Start: 2025-03-30 | End: 2025-04-28

## 2025-03-30 RX ORDER — ATORVASTATIN CALCIUM 80 MG/1
1 TABLET, FILM COATED ORAL
Qty: 30 | Refills: 0
Start: 2025-03-30 | End: 2025-04-28

## 2025-03-30 RX ORDER — LACOSAMIDE 150 MG/1
100 TABLET, FILM COATED ORAL
Refills: 0 | Status: DISCONTINUED | OUTPATIENT
Start: 2025-03-30 | End: 2025-03-30

## 2025-03-30 RX ORDER — ASPIRIN 325 MG
1 TABLET ORAL
Qty: 30 | Refills: 0
Start: 2025-03-30 | End: 2025-04-28

## 2025-03-30 RX ADMIN — ENOXAPARIN SODIUM 40 MILLIGRAM(S): 100 INJECTION SUBCUTANEOUS at 06:49

## 2025-03-30 RX ADMIN — LACOSAMIDE 100 MILLIGRAM(S): 150 TABLET, FILM COATED ORAL at 11:27

## 2025-03-30 RX ADMIN — MEMANTINE HYDROCHLORIDE 10 MILLIGRAM(S): 21 CAPSULE, EXTENDED RELEASE ORAL at 06:49

## 2025-03-30 RX ADMIN — Medication 81 MILLIGRAM(S): at 11:26

## 2025-03-30 RX ADMIN — Medication 1 MILLIGRAM(S): at 06:49

## 2025-03-30 NOTE — DISCHARGE NOTE NURSING/CASE MANAGEMENT/SOCIAL WORK - FINANCIAL ASSISTANCE
[facility] provides services at a reduced cost to those who are determined to be eligible through [facility]’s financial assistance program. Information regarding [facility]’s financial assistance program can be found by going to [email or web address] or by calling [phone].

## 2025-03-30 NOTE — PROGRESS NOTE ADULT - REASON FOR ADMISSION
TIA vs. CVA

## 2025-03-30 NOTE — DISCHARGE NOTE PROVIDER - CARE PROVIDER_API CALL
Brayan Shahid)  Neurology  [address], [address]  [place] [address]  Phone: [phone]  Fax: [phone]  Follow Up Time: 2 weeks

## 2025-03-30 NOTE — DISCHARGE NOTE PROVIDER - HOSPITAL COURSE
79 year old male with history of HLD and advanced Alzheimer's dementia who presented to the ED on 3/23/25 for complaints of inability to move left hand and admitted for CVA. His wife last saw patient at baseline self around 11:30PM night prior to admission - wife states patient was speaking in 1-2 words when he usually speaks in complete sentences and she noticed he was not moving his left hand around 11AM. NIHSS 11 as per ER physician documentation, not TNK candidate given outside the window for thrombolytic therapy. CT head was without acute intracranial findings. CTA brain was without vascular stenosis, occlusion, aneurysm or vascular malformation. CTA neck was without vessel narrowing or occlusion in the neck. CT brain perfusion showed a mismatch perfusion defect in the L. cerebral hemisphere suggestive of ischemia, mismatch volume: 42 mL, and matched perfusion defect in the R. cerebellar hemisphere. Echocardiogram showed EF 60-65% w/ mild (grade 1) left ventricular diastolic dysfunction, trace tricuspid regurgitation and agitated saline injection was negative for intracardiac shunt. Neurology was consulted & recommended MRI brain but patient was too agitated to stay still & repeat CTH from 3/25 showed no significant interval change from prior exam. As per neurology, Dr. Shahid can not entirely exclude small infarcts. I spoke w/ the patient's daughter, who agreed to defer MRI at this time. Pt was put on ASA & atorvastatin. EEG showed risk of focal onset seizures from the right temporal region w/ mild diffuse/multi-focal cerebral dysfunction. As per Dr. Goldsmith, the patient was put on Vimpat. Pt passed swallow eval and has been eating.    Discharge time: 43 minutes       Vital Signs Last 24 Hrs  T(C): 36.2 (30 Mar 2025 04:56), Max: 37 (29 Mar 2025 16:53)  T(F): 97.2 (30 Mar 2025 04:56), Max: 98.6 (29 Mar 2025 16:53)  HR: 87 (30 Mar 2025 04:56) (87 - 88)  BP: 143/66 (30 Mar 2025 04:56) (125/83 - 143/66)   RR: 17 (30 Mar 2025 04:56) (17 - 18)  SpO2: 95% (30 Mar 2025 04:56) (93% - 97%)    Parameters below as of 30 Mar 2025 04:56  Patient On (Oxygen Delivery Method): room air     PHYSICAL EXAM:  GENERAL: NAD, well-groomed, well-developed  HEAD:  Atraumatic, Normocephalic  EYES: EOMI, PERRLA   NECK: Supple   NERVOUS SYSTEM:  Alert    CHEST/LUNG: Clear to auscultation bilaterally; No rales, rhonchi, wheezing, or rubs  HEART: Regular rate and rhythm; No murmurs, rubs, or gallops  ABDOMEN: Soft, Nontender, Nondistended; Bowel sounds present  EXTREMITIES: No clubbing, cyanosis, or edema

## 2025-03-30 NOTE — DISCHARGE NOTE NURSING/CASE MANAGEMENT/SOCIAL WORK - NSDCPEFALRISK_GEN_ALL_CORE
For information on Fall & Injury Prevention, visit: [email or web address] OR  [email or web address] OR  https://www.cdc.gov/steadi/patient.html

## 2025-03-30 NOTE — DISCHARGE NOTE PROVIDER - NSDCCPCAREPLAN_GEN_ALL_CORE_FT
PRINCIPAL DISCHARGE DIAGNOSIS  Diagnosis: Altered mental status  Assessment and Plan of Treatment:

## 2025-03-30 NOTE — DISCHARGE NOTE PROVIDER - NSDCMRMEDTOKEN_GEN_ALL_CORE_FT
aspirin 81 mg oral tablet, chewable: 1 tab(s) orally once a day  atorvastatin 40 mg oral tablet: 1 tab(s) orally once a day (at bedtime)  donepezil 10 mg oral tablet: 1 tab(s) orally once a day (at bedtime)  lacosamide 100 mg oral tablet: 1 tab(s) orally 2 times a day MDD: 2 tabs/day  memantine 10 mg oral tablet: 1 tab(s) orally 2 times a day  RisperDAL M-Tab 1 mg oral tablet, disintegratin tab(s) orally once a day

## 2025-03-30 NOTE — DISCHARGE NOTE NURSING/CASE MANAGEMENT/SOCIAL WORK - PATIENT PORTAL LINK FT
You can access the FollowMyHealth Patient Portal offered by [facility] by registering at the following website: [email or web address]. By joining [facility]’s FollowMyHealth portal, you will also be able to view your health information using other applications (apps) compatible with our system.

## 2025-03-30 NOTE — PROGRESS NOTE ADULT - PROVIDER SPECIALTY LIST ADULT
Hospitalist
Neurology
Neurology
Hospitalist
Neurology
Hospitalist
Neurology

## 2025-03-30 NOTE — PROGRESS NOTE ADULT - SUBJECTIVE AND OBJECTIVE BOX
Patient seen and examined this am.              Medications: MEDICATIONS  (STANDING):  aspirin  chewable 81 milliGRAM(s) Oral daily  atorvastatin 40 milliGRAM(s) Oral at bedtime  donepezil 10 milliGRAM(s) Oral at bedtime  enoxaparin Injectable 40 milliGRAM(s) SubCutaneous every 24 hours  memantine 10 milliGRAM(s) Oral two times a day  risperiDONE  Disintegrating Tablet 1 milliGRAM(s) Oral two times a day    MEDICATIONS  (PRN):  acetaminophen     Tablet .. 650 milliGRAM(s) Oral every 6 hours PRN Temp greater or equal to 38C (100.4F), Mild Pain (1 - 3)  aluminum hydroxide/magnesium hydroxide/simethicone Suspension 30 milliLiter(s) Oral every 4 hours PRN Dyspepsia  melatonin 3 milliGRAM(s) Oral at bedtime PRN Insomnia  ondansetron Injectable 4 milliGRAM(s) IV Push every 8 hours PRN Nausea and/or Vomiting       Vitals:  Vital Signs Last 24 Hrs  T(C): 36.2 (30 Mar 2025 04:56), Max: 37 (29 Mar 2025 16:53)  T(F): 97.2 (30 Mar 2025 04:56), Max: 98.6 (29 Mar 2025 16:53)  HR: 87 (30 Mar 2025 04:56) (70 - 88)  BP: 143/66 (30 Mar 2025 04:56) (125/83 - 143/66)  BP(mean): --  RR: 17 (30 Mar 2025 04:56) (17 - 18)  SpO2: 95% (30 Mar 2025 04:56) (93% - 97%)    Parameters below as of 30 Mar 2025 04:56  Patient On (Oxygen Delivery Method): room air            On Neurological Examination:    Neuro: eyes opesn perseverates not following commands  CN: PERRL, EOMI, VFF normal gaze preference, no facial palsy,     Motor: CEDRICK seems to have possible LUE dysmetria    Sensory: Intact to LT and PP no extinction    Coordination: FTN intact b/l            GENERAL Exam:     Nontoxic , No Acute Distress   	  HEENT:  normocephalic, atraumatic  		  LUNGS:	Clear bilaterally  No Wheeze    	  HEART:	 Normal S1S2   No murmur RRR        	  GI/ ABDOMEN:  Soft  Non tender    EXTREMITIES:   No Edema  No Clubbing  No Cyanosis No Edema    MUSCULOSKELETAL: Normal Range of Motion  	   SKIN:      Normal   No Ecchymosis       LABS:                       RADIOLOGY  < from: CT Brain Stroke Protocol (03.23.25 @ 15:08) >  CTA HEAD:    Evaluation of the anterior circulation demonstrates scattered   atherosclerotic calcifications along the distal internal carotid arteries.    The proximal anterior, middle and posterior cerebral arteries are patent   bilaterally.    Evaluation of the posterior circulation demonstrates patent   vertebrobasilar system.    No evidence of vascular stenosis, occlusion, aneurysm or vascular  malformation in the [place].        IMPRESSION:    CT HEAD: No acute intracranial findings.    Findings discussed with Dr. Werner of the patient's clinical team on   3/23/2025 at 3:12 PM.    CT PERFUSION: Mismatch perfusion defect in the left cerebral hemisphere   suggestive of ischemia. Mismatch volume: 42 mL.    < end of copied text >        < from: CT Head No Cont (03.25.25 @ 17:10) >    IMPRESSION:  Degraded by motion.  No evidence of an acute intracranial hemorrhage, midline shift, or   hydrocephalus.  Atrophy with confluent white matter ischemic changes.  No significant interval change from prior exam.    < end of copied text >          EEG Classification / Summary:  Abnormal EEG study  Frequent F8 max sharp waves  Mild generalized background slowing    -----------------------------------------------------------------------------------------------------    Clinical Impression:  Risk of focal onset seizures from the right temporal region.  Mild diffuse/multi-focal cerebral dysfunction, not specific as to etiology.  e   Patient seen and examined this am.              Medications: MEDICATIONS  (STANDING):  aspirin  chewable 81 milliGRAM(s) Oral daily  atorvastatin 40 milliGRAM(s) Oral at bedtime  donepezil 10 milliGRAM(s) Oral at bedtime  enoxaparin Injectable 40 milliGRAM(s) SubCutaneous every 24 hours  memantine 10 milliGRAM(s) Oral two times a day  risperiDONE  Disintegrating Tablet 1 milliGRAM(s) Oral two times a day    MEDICATIONS  (PRN):  acetaminophen     Tablet .. 650 milliGRAM(s) Oral every 6 hours PRN Temp greater or equal to 38C (100.4F), Mild Pain (1 - 3)  aluminum hydroxide/magnesium hydroxide/simethicone Suspension 30 milliLiter(s) Oral every 4 hours PRN Dyspepsia  melatonin 3 milliGRAM(s) Oral at bedtime PRN Insomnia  ondansetron Injectable 4 milliGRAM(s) IV Push every 8 hours PRN Nausea and/or Vomiting       Vitals:  Vital Signs Last 24 Hrs  T(C): 36.2 (30 Mar 2025 04:56), Max: 37 (29 Mar 2025 16:53)  T(F): 97.2 (30 Mar 2025 04:56), Max: 98.6 (29 Mar 2025 16:53)  HR: 87 (30 Mar 2025 04:56) (70 - 88)  BP: 143/66 (30 Mar 2025 04:56) (125/83 - 143/66)  BP(mean): --  RR: 17 (30 Mar 2025 04:56) (17 - 18)  SpO2: 95% (30 Mar 2025 04:56) (93% - 97%)    Parameters below as of 30 Mar 2025 04:56  Patient On (Oxygen Delivery Method): room air            On Neurological Examination:    Neuro: awake, alert, oriented x 1, follows simple commands   CN: PERRL, EOMI, VFF normal gaze preference, no facial palsy,     Motor: PHILIP seems to have possible LUE dysmetria    Sensory: Intact to LT and PP no extinction    Coordination: FTN intact b/l            GENERAL Exam:     Nontoxic , No Acute Distress   	  HEENT:  normocephalic, atraumatic  		  LUNGS:	Clear bilaterally  No Wheeze    	  HEART:	 Normal S1S2   No murmur RRR        	  GI/ ABDOMEN:  Soft  Non tender    EXTREMITIES:   No Edema  No Clubbing  No Cyanosis No Edema    MUSCULOSKELETAL: Normal Range of Motion  	   SKIN:      Normal   No Ecchymosis       LABS:                       RADIOLOGY  < from: CT Brain Stroke Protocol (03.23.25 @ 15:08) >  CTA HEAD:    Evaluation of the anterior circulation demonstrates scattered   atherosclerotic calcifications along the distal internal carotid arteries.    The proximal anterior, middle and posterior cerebral arteries are patent   bilaterally.    Evaluation of the posterior circulation demonstrates patent   vertebrobasilar system.    No evidence of vascular stenosis, occlusion, aneurysm or vascular  malformation in the [place].        IMPRESSION:    CT HEAD: No acute intracranial findings.    Findings discussed with Dr. Werner of the patient's clinical team on   3/23/2025 at 3:12 PM.    CT PERFUSION: Mismatch perfusion defect in the left cerebral hemisphere   suggestive of ischemia. Mismatch volume: 42 mL.    < end of copied text >        < from: CT Head No Cont (03.25.25 @ 17:10) >    IMPRESSION:  Degraded by motion.  No evidence of an acute intracranial hemorrhage, midline shift, or   hydrocephalus.  Atrophy with confluent white matter ischemic changes.  No significant interval change from prior exam.    < end of copied text >          EEG Classification / Summary:  Abnormal EEG study  Frequent F8 max sharp waves  Mild generalized background slowing    -----------------------------------------------------------------------------------------------------    Clinical Impression:  Risk of focal onset seizures from the right temporal region.  Mild diffuse/multi-focal cerebral dysfunction, not specific as to etiology.  e

## 2025-03-30 NOTE — PROGRESS NOTE ADULT - ASSESSMENT
79 year old male with PMHx of HLD and Alzheimer's dementia who presents with worsening confusion, per wife moving left hand less. Exam eyes open perseverates, not following commands seems to have possible LUE dysmetria   CTH - CTA no LVO  CTP Mismatch perfusion defect in the left cerebral hemisphere suggestive of ischemia.   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   Atorvastatin 80mg,   TTE no significant findings   DVT prophylaxis  gradual normotension.   PT/OT/Speech    Outpatient neuro follow up on violetta Goldsmith DO  Vascular Neurology  Office [phone]  Available via [employer] Teams

## 2025-04-01 RX ORDER — RISPERIDONE 4 MG
1 TABLET ORAL
Qty: 14 | Refills: 0
Start: 2025-04-01 | End: 2025-04-14

## 2025-04-03 DIAGNOSIS — E78.5 HYPERLIPIDEMIA, UNSPECIFIED: ICD-10-CM

## 2025-04-03 DIAGNOSIS — R45.1 RESTLESSNESS AND AGITATION: ICD-10-CM

## 2025-04-03 DIAGNOSIS — R41.82 ALTERED MENTAL STATUS, UNSPECIFIED: ICD-10-CM

## 2025-04-03 DIAGNOSIS — G40.909 EPILEPSY, UNSPECIFIED, NOT INTRACTABLE, WITHOUT STATUS EPILEPTICUS: ICD-10-CM

## 2025-04-03 DIAGNOSIS — R73.03 PREDIABETES: ICD-10-CM

## 2025-04-03 DIAGNOSIS — Z88.8 ALLERGY STATUS TO OTHER DRUGS, MEDICAMENTS AND BIOLOGICAL SUBSTANCES: ICD-10-CM

## 2025-04-03 DIAGNOSIS — I10 ESSENTIAL (PRIMARY) HYPERTENSION: ICD-10-CM

## 2025-04-03 DIAGNOSIS — F02.80 DEMENTIA IN OTHER DISEASES CLASSIFIED ELSEWHERE, UNSPECIFIED SEVERITY, WITHOUT BEHAVIORAL DISTURBANCE, PSYCHOTIC DISTURBANCE, MOOD DISTURBANCE, AND ANXIETY: ICD-10-CM

## 2025-04-03 DIAGNOSIS — G30.9 ALZHEIMER'S DISEASE, UNSPECIFIED: ICD-10-CM
